# Patient Record
Sex: FEMALE | Race: WHITE | Employment: OTHER | URBAN - NONMETROPOLITAN AREA
[De-identification: names, ages, dates, MRNs, and addresses within clinical notes are randomized per-mention and may not be internally consistent; named-entity substitution may affect disease eponyms.]

---

## 2019-01-17 ENCOUNTER — HOSPITAL ENCOUNTER (EMERGENCY)
Age: 84
Discharge: HOME | End: 2019-01-17
Payer: MEDICARE

## 2019-01-17 VITALS
RESPIRATION RATE: 16 BRPM | SYSTOLIC BLOOD PRESSURE: 197 MMHG | OXYGEN SATURATION: 98 % | DIASTOLIC BLOOD PRESSURE: 84 MMHG | HEART RATE: 65 BPM | WEIGHT: 129.4 LBS | BODY MASS INDEX: 27.16 KG/M2 | HEIGHT: 58 IN | TEMPERATURE: 97.2 F

## 2019-01-17 DIAGNOSIS — I10 ELEVATED BLOOD PRESSURE READING WITH DIAGNOSIS OF HYPERTENSION: ICD-10-CM

## 2019-01-17 DIAGNOSIS — H61.23 BILATERAL IMPACTED CERUMEN: Primary | ICD-10-CM

## 2019-01-17 PROCEDURE — 99213 OFFICE O/P EST LOW 20 MIN: CPT | Performed by: NURSE PRACTITIONER

## 2019-01-17 PROCEDURE — 99213 OFFICE O/P EST LOW 20 MIN: CPT

## 2019-01-17 PROCEDURE — 69209 REMOVE IMPACTED EAR WAX UNI: CPT

## 2019-01-17 PROCEDURE — 2709999900 HC NON-CHARGEABLE SUPPLY

## 2019-01-17 ASSESSMENT — ENCOUNTER SYMPTOMS
ABDOMINAL PAIN: 0
NAUSEA: 0
SHORTNESS OF BREATH: 0
VOMITING: 0
CHEST TIGHTNESS: 0
DIARRHEA: 0

## 2024-04-14 ENCOUNTER — APPOINTMENT (OUTPATIENT)
Dept: GENERAL RADIOLOGY | Age: 89
DRG: 253 | End: 2024-04-14
Payer: MEDICARE

## 2024-04-14 ENCOUNTER — HOSPITAL ENCOUNTER (INPATIENT)
Age: 89
LOS: 6 days | Discharge: HOME | DRG: 253 | End: 2024-04-20
Attending: FAMILY MEDICINE
Payer: MEDICARE

## 2024-04-14 DIAGNOSIS — Z01.818 PREOPERATIVE CLEARANCE: ICD-10-CM

## 2024-04-14 DIAGNOSIS — N18.31 STAGE 3A CHRONIC KIDNEY DISEASE (HCC): ICD-10-CM

## 2024-04-14 DIAGNOSIS — Z98.890 S/P ANGIOGRAM OF EXTREMITY: ICD-10-CM

## 2024-04-14 DIAGNOSIS — I96 GANGRENE OF TOE OF LEFT FOOT (HCC): Primary | ICD-10-CM

## 2024-04-14 DIAGNOSIS — M86.18 OTHER ACUTE OSTEOMYELITIS, OTHER SITE (HCC): ICD-10-CM

## 2024-04-14 DIAGNOSIS — M86.9 OSTEOMYELITIS OF SECOND TOE OF LEFT FOOT (HCC): ICD-10-CM

## 2024-04-14 PROBLEM — E87.5 HYPERKALEMIA: Status: ACTIVE | Noted: 2024-04-14

## 2024-04-14 PROBLEM — E87.1 HYPONATREMIA: Status: ACTIVE | Noted: 2024-04-14

## 2024-04-14 LAB
ALBUMIN SERPL BCG-MCNC: 3.4 G/DL (ref 3.5–5.1)
ALP SERPL-CCNC: 145 U/L (ref 38–126)
ALT SERPL W/O P-5'-P-CCNC: 12 U/L (ref 11–66)
ANION GAP SERPL CALC-SCNC: 12 MEQ/L (ref 8–16)
AST SERPL-CCNC: 16 U/L (ref 5–40)
BASOPHILS ABSOLUTE: 0.1 THOU/MM3 (ref 0–0.1)
BASOPHILS NFR BLD AUTO: 1 %
BILIRUB CONJ SERPL-MCNC: < 0.2 MG/DL (ref 0–0.3)
BILIRUB SERPL-MCNC: 0.4 MG/DL (ref 0.3–1.2)
BUN SERPL-MCNC: 22 MG/DL (ref 7–22)
CALCIUM SERPL-MCNC: 9 MG/DL (ref 8.5–10.5)
CHLORIDE SERPL-SCNC: 97 MEQ/L (ref 98–111)
CO2 SERPL-SCNC: 22 MEQ/L (ref 23–33)
CREAT SERPL-MCNC: 1.3 MG/DL (ref 0.4–1.2)
CRP SERPL-MCNC: 6.97 MG/DL (ref 0–1)
DEPRECATED RDW RBC AUTO: 47.7 FL (ref 35–45)
EOSINOPHIL NFR BLD AUTO: 2.6 %
EOSINOPHILS ABSOLUTE: 0.2 THOU/MM3 (ref 0–0.4)
ERYTHROCYTE [DISTWIDTH] IN BLOOD BY AUTOMATED COUNT: 13.2 % (ref 11.5–14.5)
ERYTHROCYTE [SEDIMENTATION RATE] IN BLOOD BY WESTERGREN METHOD: 38 MM/HR (ref 0–20)
GFR SERPL CREATININE-BSD FRML MDRD: 40 ML/MIN/1.73M2
GLUCOSE BLD STRIP.AUTO-MCNC: 174 MG/DL (ref 70–108)
GLUCOSE BLD STRIP.AUTO-MCNC: 215 MG/DL (ref 70–108)
GLUCOSE BLD STRIP.AUTO-MCNC: 244 MG/DL (ref 70–108)
GLUCOSE SERPL-MCNC: 273 MG/DL (ref 70–108)
HCT VFR BLD AUTO: 43.9 % (ref 37–47)
HGB BLD-MCNC: 14 GM/DL (ref 12–16)
IMM GRANULOCYTES # BLD AUTO: 0.04 THOU/MM3 (ref 0–0.07)
IMM GRANULOCYTES NFR BLD AUTO: 0.6 %
LACTIC ACID, SEPSIS: 2.7 MMOL/L (ref 0.5–1.9)
LYMPHOCYTES ABSOLUTE: 1.6 THOU/MM3 (ref 1–4.8)
LYMPHOCYTES NFR BLD AUTO: 21.5 %
MCH RBC QN AUTO: 31.7 PG (ref 26–33)
MCHC RBC AUTO-ENTMCNC: 31.9 GM/DL (ref 32.2–35.5)
MCV RBC AUTO: 99.5 FL (ref 81–99)
MONOCYTES ABSOLUTE: 0.6 THOU/MM3 (ref 0.4–1.3)
MONOCYTES NFR BLD AUTO: 8.8 %
NEUTROPHILS NFR BLD AUTO: 65.5 %
NRBC BLD AUTO-RTO: 0 /100 WBC
OSMOLALITY SERPL CALC.SUM OF ELEC: 275.7 MOSMOL/KG (ref 275–300)
PLATELET # BLD AUTO: 236 THOU/MM3 (ref 130–400)
PMV BLD AUTO: 9.3 FL (ref 9.4–12.4)
POTASSIUM SERPL-SCNC: 5.3 MEQ/L (ref 3.5–5.2)
PROT SERPL-MCNC: 7.1 G/DL (ref 6.1–8)
RBC # BLD AUTO: 4.41 MILL/MM3 (ref 4.2–5.4)
REASON FOR REJECTION: NORMAL
REJECTED TEST: NORMAL
SEGMENTED NEUTROPHILS ABSOLUTE COUNT: 4.8 THOU/MM3 (ref 1.8–7.7)
SODIUM SERPL-SCNC: 131 MEQ/L (ref 135–145)
WBC # BLD AUTO: 7.3 THOU/MM3 (ref 4.8–10.8)

## 2024-04-14 PROCEDURE — 6360000002 HC RX W HCPCS: Performed by: FAMILY MEDICINE

## 2024-04-14 PROCEDURE — 87077 CULTURE AEROBIC IDENTIFY: CPT

## 2024-04-14 PROCEDURE — 36415 COLL VENOUS BLD VENIPUNCTURE: CPT

## 2024-04-14 PROCEDURE — 2580000003 HC RX 258: Performed by: PHYSICIAN ASSISTANT

## 2024-04-14 PROCEDURE — 73660 X-RAY EXAM OF TOE(S): CPT

## 2024-04-14 PROCEDURE — 82948 REAGENT STRIP/BLOOD GLUCOSE: CPT

## 2024-04-14 PROCEDURE — 82248 BILIRUBIN DIRECT: CPT

## 2024-04-14 PROCEDURE — 87205 SMEAR GRAM STAIN: CPT

## 2024-04-14 PROCEDURE — 86140 C-REACTIVE PROTEIN: CPT

## 2024-04-14 PROCEDURE — 83605 ASSAY OF LACTIC ACID: CPT

## 2024-04-14 PROCEDURE — 85651 RBC SED RATE NONAUTOMATED: CPT

## 2024-04-14 PROCEDURE — 99222 1ST HOSP IP/OBS MODERATE 55: CPT | Performed by: INTERNAL MEDICINE

## 2024-04-14 PROCEDURE — 6360000002 HC RX W HCPCS: Performed by: PHYSICIAN ASSISTANT

## 2024-04-14 PROCEDURE — 87186 SC STD MICRODIL/AGAR DIL: CPT

## 2024-04-14 PROCEDURE — 87040 BLOOD CULTURE FOR BACTERIA: CPT

## 2024-04-14 PROCEDURE — 2580000003 HC RX 258: Performed by: INTERNAL MEDICINE

## 2024-04-14 PROCEDURE — 2580000003 HC RX 258: Performed by: FAMILY MEDICINE

## 2024-04-14 PROCEDURE — 80053 COMPREHEN METABOLIC PANEL: CPT

## 2024-04-14 PROCEDURE — 87075 CULTR BACTERIA EXCEPT BLOOD: CPT

## 2024-04-14 PROCEDURE — 6370000000 HC RX 637 (ALT 250 FOR IP): Performed by: PHYSICIAN ASSISTANT

## 2024-04-14 PROCEDURE — 1200000003 HC TELEMETRY R&B

## 2024-04-14 PROCEDURE — 96365 THER/PROPH/DIAG IV INF INIT: CPT

## 2024-04-14 PROCEDURE — 87147 CULTURE TYPE IMMUNOLOGIC: CPT

## 2024-04-14 PROCEDURE — 85025 COMPLETE CBC W/AUTO DIFF WBC: CPT

## 2024-04-14 PROCEDURE — 87070 CULTURE OTHR SPECIMN AEROBIC: CPT

## 2024-04-14 PROCEDURE — 99285 EMERGENCY DEPT VISIT HI MDM: CPT

## 2024-04-14 PROCEDURE — 99223 1ST HOSP IP/OBS HIGH 75: CPT | Performed by: PHYSICIAN ASSISTANT

## 2024-04-14 RX ORDER — DEXTROSE MONOHYDRATE 100 MG/ML
INJECTION, SOLUTION INTRAVENOUS CONTINUOUS PRN
Status: DISCONTINUED | OUTPATIENT
Start: 2024-04-14 | End: 2024-04-20 | Stop reason: HOSPADM

## 2024-04-14 RX ORDER — INSULIN LISPRO 100 [IU]/ML
0-4 INJECTION, SOLUTION INTRAVENOUS; SUBCUTANEOUS NIGHTLY
Status: DISCONTINUED | OUTPATIENT
Start: 2024-04-14 | End: 2024-04-16

## 2024-04-14 RX ORDER — CARVEDILOL 3.12 MG/1
3.12 TABLET ORAL 2 TIMES DAILY WITH MEALS
COMMUNITY

## 2024-04-14 RX ORDER — ACETAMINOPHEN 325 MG/1
650 TABLET ORAL EVERY 6 HOURS PRN
Status: DISCONTINUED | OUTPATIENT
Start: 2024-04-14 | End: 2024-04-15

## 2024-04-14 RX ORDER — SODIUM CHLORIDE 9 MG/ML
INJECTION, SOLUTION INTRAVENOUS PRN
Status: DISCONTINUED | OUTPATIENT
Start: 2024-04-14 | End: 2024-04-20 | Stop reason: HOSPADM

## 2024-04-14 RX ORDER — SODIUM CHLORIDE 0.9 % (FLUSH) 0.9 %
5-40 SYRINGE (ML) INJECTION EVERY 12 HOURS SCHEDULED
Status: DISCONTINUED | OUTPATIENT
Start: 2024-04-14 | End: 2024-04-20 | Stop reason: HOSPADM

## 2024-04-14 RX ORDER — HEPARIN SODIUM 5000 [USP'U]/ML
5000 INJECTION, SOLUTION INTRAVENOUS; SUBCUTANEOUS EVERY 8 HOURS SCHEDULED
Status: DISCONTINUED | OUTPATIENT
Start: 2024-04-14 | End: 2024-04-20 | Stop reason: HOSPADM

## 2024-04-14 RX ORDER — BUMETANIDE 1 MG/1
1 TABLET ORAL
Status: DISCONTINUED | OUTPATIENT
Start: 2024-04-15 | End: 2024-04-20 | Stop reason: HOSPADM

## 2024-04-14 RX ORDER — ISOSORBIDE MONONITRATE 30 MG/1
30 TABLET, EXTENDED RELEASE ORAL DAILY
COMMUNITY

## 2024-04-14 RX ORDER — ISOSORBIDE MONONITRATE 30 MG/1
30 TABLET, EXTENDED RELEASE ORAL DAILY
Status: DISCONTINUED | OUTPATIENT
Start: 2024-04-15 | End: 2024-04-20 | Stop reason: HOSPADM

## 2024-04-14 RX ORDER — ONDANSETRON 4 MG/1
4 TABLET, ORALLY DISINTEGRATING ORAL EVERY 8 HOURS PRN
Status: DISCONTINUED | OUTPATIENT
Start: 2024-04-14 | End: 2024-04-20 | Stop reason: HOSPADM

## 2024-04-14 RX ORDER — LANOLIN ALCOHOL/MO/W.PET/CERES
325 CREAM (GRAM) TOPICAL
COMMUNITY

## 2024-04-14 RX ORDER — SACUBITRIL AND VALSARTAN 24; 26 MG/1; MG/1
1 TABLET, FILM COATED ORAL 2 TIMES DAILY
COMMUNITY

## 2024-04-14 RX ORDER — METRONIDAZOLE 500 MG/100ML
500 INJECTION, SOLUTION INTRAVENOUS EVERY 8 HOURS
Status: DISCONTINUED | OUTPATIENT
Start: 2024-04-14 | End: 2024-04-17

## 2024-04-14 RX ORDER — GLIMEPIRIDE 4 MG/1
4 TABLET ORAL 2 TIMES DAILY
COMMUNITY

## 2024-04-14 RX ORDER — FLUOXETINE 10 MG/1
10 TABLET, FILM COATED ORAL DAILY
COMMUNITY

## 2024-04-14 RX ORDER — M-VIT,TX,IRON,MINS/CALC/FOLIC 27MG-0.4MG
1 TABLET ORAL DAILY
COMMUNITY

## 2024-04-14 RX ORDER — INSULIN LISPRO 100 [IU]/ML
0-4 INJECTION, SOLUTION INTRAVENOUS; SUBCUTANEOUS
Status: DISCONTINUED | OUTPATIENT
Start: 2024-04-14 | End: 2024-04-16

## 2024-04-14 RX ORDER — SODIUM CHLORIDE 0.9 % (FLUSH) 0.9 %
5-40 SYRINGE (ML) INJECTION PRN
Status: DISCONTINUED | OUTPATIENT
Start: 2024-04-14 | End: 2024-04-20 | Stop reason: HOSPADM

## 2024-04-14 RX ORDER — ACETAMINOPHEN 650 MG/1
650 SUPPOSITORY RECTAL EVERY 6 HOURS PRN
Status: DISCONTINUED | OUTPATIENT
Start: 2024-04-14 | End: 2024-04-18

## 2024-04-14 RX ORDER — BUMETANIDE 1 MG/1
1 TABLET ORAL
COMMUNITY

## 2024-04-14 RX ORDER — CARVEDILOL 3.12 MG/1
3.12 TABLET ORAL 2 TIMES DAILY WITH MEALS
Status: DISCONTINUED | OUTPATIENT
Start: 2024-04-14 | End: 2024-04-20 | Stop reason: HOSPADM

## 2024-04-14 RX ORDER — ATORVASTATIN CALCIUM 20 MG/1
20 TABLET, FILM COATED ORAL NIGHTLY
Status: DISCONTINUED | OUTPATIENT
Start: 2024-04-14 | End: 2024-04-16

## 2024-04-14 RX ORDER — FERROUS SULFATE 325(65) MG
325 TABLET ORAL 2 TIMES DAILY WITH MEALS
Status: DISCONTINUED | OUTPATIENT
Start: 2024-04-14 | End: 2024-04-20 | Stop reason: HOSPADM

## 2024-04-14 RX ORDER — GLUCAGON 1 MG/ML
1 KIT INJECTION PRN
Status: DISCONTINUED | OUTPATIENT
Start: 2024-04-14 | End: 2024-04-20 | Stop reason: HOSPADM

## 2024-04-14 RX ORDER — SODIUM CHLORIDE 9 MG/ML
INJECTION, SOLUTION INTRAVENOUS CONTINUOUS
Status: DISCONTINUED | OUTPATIENT
Start: 2024-04-14 | End: 2024-04-16

## 2024-04-14 RX ORDER — MIRTAZAPINE 15 MG/1
15 TABLET, FILM COATED ORAL NIGHTLY
Status: DISCONTINUED | OUTPATIENT
Start: 2024-04-14 | End: 2024-04-20 | Stop reason: HOSPADM

## 2024-04-14 RX ORDER — ONDANSETRON 2 MG/ML
4 INJECTION INTRAMUSCULAR; INTRAVENOUS EVERY 6 HOURS PRN
Status: DISCONTINUED | OUTPATIENT
Start: 2024-04-14 | End: 2024-04-20 | Stop reason: HOSPADM

## 2024-04-14 RX ORDER — FLUOXETINE 10 MG/1
10 CAPSULE ORAL DAILY
Status: DISCONTINUED | OUTPATIENT
Start: 2024-04-15 | End: 2024-04-20 | Stop reason: HOSPADM

## 2024-04-14 RX ORDER — POLYETHYLENE GLYCOL 3350 17 G/17G
17 POWDER, FOR SOLUTION ORAL DAILY PRN
Status: DISCONTINUED | OUTPATIENT
Start: 2024-04-14 | End: 2024-04-20 | Stop reason: HOSPADM

## 2024-04-14 RX ORDER — MIRTAZAPINE 15 MG/1
15 TABLET, FILM COATED ORAL ONCE
COMMUNITY

## 2024-04-14 RX ORDER — ASPIRIN 81 MG/1
81 TABLET, CHEWABLE ORAL DAILY
Status: DISCONTINUED | OUTPATIENT
Start: 2024-04-15 | End: 2024-04-20 | Stop reason: HOSPADM

## 2024-04-14 RX ORDER — 0.9 % SODIUM CHLORIDE 0.9 %
500 INTRAVENOUS SOLUTION INTRAVENOUS ONCE
Status: COMPLETED | OUTPATIENT
Start: 2024-04-14 | End: 2024-04-14

## 2024-04-14 RX ORDER — POTASSIUM CHLORIDE 750 MG/1
10 TABLET, EXTENDED RELEASE ORAL
COMMUNITY

## 2024-04-14 RX ADMIN — VANCOMYCIN HYDROCHLORIDE 1250 MG: 5 INJECTION, POWDER, LYOPHILIZED, FOR SOLUTION INTRAVENOUS at 19:49

## 2024-04-14 RX ADMIN — ATORVASTATIN CALCIUM 20 MG: 20 TABLET, FILM COATED ORAL at 21:27

## 2024-04-14 RX ADMIN — METRONIDAZOLE 500 MG: 500 INJECTION, SOLUTION INTRAVENOUS at 18:33

## 2024-04-14 RX ADMIN — CEFEPIME 2000 MG: 2 INJECTION, POWDER, FOR SOLUTION INTRAVENOUS at 17:59

## 2024-04-14 RX ADMIN — INSULIN LISPRO 1 UNITS: 100 INJECTION, SOLUTION INTRAVENOUS; SUBCUTANEOUS at 18:38

## 2024-04-14 RX ADMIN — CARVEDILOL 3.12 MG: 3.12 TABLET, FILM COATED ORAL at 18:36

## 2024-04-14 RX ADMIN — SODIUM CHLORIDE 500 ML: 9 INJECTION, SOLUTION INTRAVENOUS at 18:34

## 2024-04-14 RX ADMIN — FERROUS SULFATE TAB 325 MG (65 MG ELEMENTAL FE) 325 MG: 325 (65 FE) TAB at 18:34

## 2024-04-14 RX ADMIN — HEPARIN SODIUM 5000 UNITS: 5000 INJECTION INTRAVENOUS; SUBCUTANEOUS at 18:39

## 2024-04-14 RX ADMIN — SODIUM CHLORIDE, PRESERVATIVE FREE 10 ML: 5 INJECTION INTRAVENOUS at 21:27

## 2024-04-14 RX ADMIN — PIPERACILLIN AND TAZOBACTAM 4500 MG: 4; .5 INJECTION, POWDER, FOR SOLUTION INTRAVENOUS at 15:16

## 2024-04-14 RX ADMIN — MIRTAZAPINE 15 MG: 15 TABLET, FILM COATED ORAL at 21:27

## 2024-04-14 RX ADMIN — SODIUM CHLORIDE: 9 INJECTION, SOLUTION INTRAVENOUS at 21:29

## 2024-04-14 ASSESSMENT — PAIN DESCRIPTION - LOCATION: LOCATION: TOE (COMMENT WHICH ONE)

## 2024-04-14 ASSESSMENT — PAIN SCALES - GENERAL
PAINLEVEL_OUTOF10: 5
PAINLEVEL_OUTOF10: 0

## 2024-04-14 ASSESSMENT — PAIN - FUNCTIONAL ASSESSMENT: PAIN_FUNCTIONAL_ASSESSMENT: 0-10

## 2024-04-14 ASSESSMENT — PAIN DESCRIPTION - FREQUENCY: FREQUENCY: CONTINUOUS

## 2024-04-14 ASSESSMENT — PAIN DESCRIPTION - PAIN TYPE: TYPE: ACUTE PAIN

## 2024-04-14 NOTE — ED NOTES
ED to inpatient nurses report      Chief Complaint:  No chief complaint on file.    Present to ED from: home    MOA:     LOC: alert and orientated to name, place, date  Mobility: Requires assistance * 1  Oxygen Baseline: room air    Current needs required: none     Code Status:   Full Code    What abnormal results were found and what did you give/do to treat them? Lactic 2.7  Any procedures or intervention occur? zosyn    Mental Status:       Psych Assessment:        Vitals:  Patient Vitals for the past 24 hrs:   BP Temp Temp src Pulse Resp SpO2 Height Weight   04/14/24 1553 (!) 175/66 -- -- 80 -- 97 % -- --   04/14/24 1423 (!) 171/74 -- -- 80 -- 96 % -- --   04/14/24 1252 (!) 179/76 97.9 °F (36.6 °C) Oral 84 18 99 % 1.473 m (4' 10\") 58.1 kg (128 lb)        LDAs:   Peripheral IV 04/14/24 Distal;Posterior;Right Forearm (Active)   Site Assessment Clean, dry & intact 04/14/24 1606   Line Status Flushed 04/14/24 1606   Line Care Connections checked and tightened 04/14/24 1606   Phlebitis Assessment No symptoms 04/14/24 1606   Infiltration Assessment 0 04/14/24 1606   Dressing Status Clean, dry & intact 04/14/24 1606   Dressing Type Transparent 04/14/24 1606       Ambulatory Status:  No data recorded    Diagnosis:  DISPOSITION Admitted 04/14/2024 04:09:03 PM   Final diagnoses:   Gangrene of toe of left foot (HCC)   Osteomyelitis of second toe of left foot (HCC)        Consults:  IP CONSULT TO PHARMACY     Pain Score:  Pain Assessment  Pain Assessment: 0-10  Pain Level: 5  Pain Location: Toe (Comment which one)  Pain Type: Acute pain  Pain Frequency: Continuous    C-SSRS:        Sepsis Screening:  Sepsis Risk Score: 1.33    [place] Fall Risk:       Swallow Screening        Preferred Language:   English      ALLERGIES     Patient has no known allergies.    SURGICAL HISTORY       Past Surgical History:   Procedure Laterality Date    CARDIAC CATHETERIZATION      with stenting    CHOLECYSTECTOMY      CORONARY ANGIOPLASTY WITH

## 2024-04-14 NOTE — H&P
Hospitalist History & Physical    Patient:  Shelli Herndon    Unit/Bed:31/031A  YOB: 1935  MRN: [id number]   Acct: [id number]   PCP: Gurmeet Reilly MD  Code Status: Full Code    Date of Service: The patient was seen and examined on 04/14/24 and admitted to Inpatient with an expected length of stay of greater than two midnights due to medical therapy.     Chief Complaint: Toe wound    Assessment/Plan:    Osteomyelitis of the left second digit of the left lower extremity  Noted on x-ray.  Follows with podiatry outpatient.  Noted to have MRSA and Enterobacter cloacae on recent culture on 3/28/2024.  Podiatry following.  Planning for amputation of digit.  Consult cardiology for consideration of peripheral artery intervention per podiatry recommendation.  Due to presence of Enterobacter, discussed antimicrobial coverage with pharmacy.  Recommended coverage with cefepime for Enterobacter.  Start vancomycin due to MRSA.  Add anaerobic coverage with Flagyl.  Peripheral arterial disease  Recent arterial Doppler with chronic occlusion of the right mid SFA, severely decreased bilateral ABIs, moderate stenosis of the left distal SFA  Continue aspirin.  Cardiology consult for consideration of further evaluation/intervention prior to amputation.  Non-insulin-dependent type 2 diabetes mellitus  Hold oral meds. Carb controlled diet. Accuchecks. Low dose sliding scale.  Hyponatremia  131.  Most recently 134 on 7/24/2023.  Small bolus of normal saline given in the emergency department.  Repeat BMP tomorrow morning.  Hyperkalemia  5.3.  Likely secondary to CKD and ARB use as well as potassium supplementation.  Hold ARB.  Hold potassium supplementation.  Recheck BMP tomorrow morning.  Lactic acidosis  2.7.  Small bolus of IV fluids given.  Recheck lactic in the morning.  HFrEF not in acute exacerbation  Echocardiogram not available.  Order placed to obtain records from [facility].  Diuretic regimen includes Bumex 1 mg

## 2024-04-14 NOTE — ED PROVIDER NOTES
EMERGENCY DEPARTMENT ENCOUNTER     CHIEF COMPLAINT   No chief complaint on file.       HPI   Shelli Herndno is a 88 y.o. female who presents with a musculoskeletal complaint, specifically macerated and reddened left 2nd toe. The onset was last day or two. The reason why the patient has this issue was she walked on her hammer toes often, and that same toe was recently diagnosed with MRSA skin infection. Per pt and her daughter Anamika (who served as an independent historian), pt had an outpatient arterial doppler done, that showed poor circulation, and she was on the path to needing Dr. CINTIA Horan to help with stenting. The patient complains of left 2nd toe infection, but due to her neuropathy is not really in that much pain. The quality is infectious. The situation was: as stateed The patient has no associated fevers, chills or purulent drainage.     Pt does not take any anti-coagulants.    PAST MEDICAL & SURGICAL HISTORY   Past Medical History:   Diagnosis Date    CAD (coronary artery disease)     heart attack    Diabetes mellitus (HCC)     Heart attack (HCC)     Hyperlipidemia     Hypertension       Past Surgical History:   Procedure Laterality Date    CARDIAC CATHETERIZATION      with stenting    CHOLECYSTECTOMY      CORONARY ANGIOPLASTY WITH STENT PLACEMENT      HYSTERECTOMY          CURRENT MEDICATIONS   Current Outpatient Rx   Medication Sig Dispense Refill    lisinopril (PRINIVIL;ZESTRIL) 20 MG tablet Take 1 tablet by mouth 2 times daily. 30 tablet 2    glipiZIDE (GLUCOTROL) 5 MG tablet Take 5 mg by mouth 2 times daily (before meals).        sitaGLIPtan (JANUVIA) 50 MG tablet Take 100 mg by mouth daily.        pioglitazone (ACTOS) 15 MG tablet Take 15 mg by mouth daily.        atorvastatin (LIPITOR) 10 MG tablet Take 10 mg by mouth daily.        propranolol (INDERAL LA) 120 MG CR capsule Take 120 mg by mouth daily.        aspirin 325 MG EC tablet Take 325 mg by mouth daily.            ALLERGIES   No Known  Chloride 97 (*)     CO2 22 (*)     Glucose 273 (*)     Creatinine 1.3 (*)     All other components within normal limits   CBC WITH AUTO DIFFERENTIAL - Abnormal; Notable for the following components:    MCV 99.5 (*)     MCHC 31.9 (*)     RDW-SD 47.7 (*)     MPV 9.3 (*)     All other components within normal limits   C-REACTIVE PROTEIN - Abnormal; Notable for the following components:    CRP 6.97 (*)     All other components within normal limits   SEDIMENTATION RATE - Abnormal; Notable for the following components:    Sed Rate 38 (*)     All other components within normal limits   GLOMERULAR FILTRATION RATE, ESTIMATED - Abnormal; Notable for the following components:    Est, Glom Filt Rate 40 (*)     All other components within normal limits   CULTURE, BLOOD 1   CULTURE, BLOOD 2   CULTURE, ANAEROBIC AND AEROBIC   SPECIMEN REJECTION   ANION GAP   OSMOLALITY        Initial Impression and Plan:  Pertinent Labs & Imaging studies reviewed and interpreted. (See chart for details)   Vitals:    04/14/24 1423   BP: (!) 171/74   Pulse: 80   Resp:    Temp:    SpO2: 96%   This is an acute problem with unknown prognosis. Given the acute worsening of the soft tissue surrounding the left 2nd hammer toe, pt likely will benefit from podiatry consultation and admission, with subsequent vascular consult (Dr. CINTIA Horan) re her left leg PVD.    Differential Diagnosis: Abscess of left 2nd toe, osteomyelitis, sepsis, Occult fracture, extremity sprain and strain    FINAL IMPRESSION   1. Gangrene of toe of left foot (HCC)    2. Osteomyelitis of second toe of left foot (HCC)       I ordered the above laboratory panel and plain films. All signs point to a serious infection and likely need for toe amputation by podiatry. Moreover, pt also may be developing osteomyelitis of that same toe. She was hemodynamically stable, and due to concern for sepsis, will add blood cultures as well as start pt on Zosyn.    ED COURSE & MEDICAL DECISION MAKING   Alexander

## 2024-04-14 NOTE — PROGRESS NOTES
Pharmacy Note - Extended Infusion Beta-Lactam Dose Adjustment    Piperacillin/Tazobactam 3375 mg x 1 intermittent infusion ordered for the treatment of Skin and soft tissue infection. Per [facility] Extended Infusion Beta-Lactam Policy, this will be changed to 4500 mg loading dose x 1     Estimated Creatinine Clearance: CrCl cannot be calculated (Patient's most recent lab result is older than the maximum 30 days allowed.).    Dialysis Status, PEBBLES, CKD: N/A    BMI: Body mass index is 26.75 kg/m².    Rationale for Adjustment: Dose adjusted per [facility] Extended Infusion Policy based on renal function and indication. The above medication is renally eliminated and demonstrates time-dependent effects on bacterial eradication. Extended-infusion dosing strategy aims to enhance microbiologic and clinical efficacy.     Pharmacy will monitor renal function daily and adjust dose as necessary.      Please call with any questions.    Thank you,  Kimi Cochran, PharmD  4/14/2024  1:30 PM

## 2024-04-14 NOTE — PROGRESS NOTES
Pharmacy Note - Extended Infusion Beta-Lactam Dose Adjustment    Cefepime 1000 mg q24h intermittent infusion for treatment of SSTI and bone and joint infection. Per [facility] Extended Infusion Beta-Lactam Policy, cefepime will be changed to 2000 mg loading dose followed by 1000 mg q12h extended infusion     Estimated Creatinine Clearance: Estimated Creatinine Clearance: 25 mL/min (A) (based on SCr of 1.3 mg/dL (H)).    Dialysis Status, PEBBLES, CKD: PEBBLES on CKD    BMI: Body mass index is 26.75 kg/m².    Rationale for Adjustment: Dose adjusted per [facility] Extended Infusion Policy based on renal function and indication. The above medication is renally eliminated and demonstrates time-dependent effects on bacterial eradication. Extended-infusion dosing strategy aims to enhance microbiologic and clinical efficacy.     Pharmacy will monitor renal function daily and adjust dose as necessary.      Please call with any questions.    Thank you,  Philomena Bravo, PharmD  PGY1 Resident

## 2024-04-14 NOTE — PROGRESS NOTES
[facility]   Pharmacy Pharmacokinetic Monitoring Service - Vancomycin     Shelli Herndon is a 88 y.o. female starting on vancomycin therapy for SSTI, bone & joint infection. Pharmacy consulted by Raheel Gil for monitoring and adjustment.    Target Concentration:     Additional Antimicrobials: Cefepime, Metronidazole     Pertinent Laboratory Values:   Wt Readings from Last 1 Encounters:   04/14/24 58.1 kg (128 lb)     Temp Readings from Last 1 Encounters:   04/14/24 97.9 °F (36.6 °C) (Oral)     Estimated Creatinine Clearance: 25 mL/min (A) (based on SCr of 1.3 mg/dL (H)).  Recent Labs     04/14/24  1335   CREATININE 1.3*   BUN 22   WBC 7.3     Pertinent Cultures:  Date Source Results   3/28/24 Left 2nd toe ulcer MRSA, enterobacter   MRSA Nasal Swab: N/A. Non-respiratory infection.    Plan:  Concentration-guided dosing due to renal impairment/insufficiency  Start vancomycin 1250 mg IV X 1  Renal labs as indicated- has BMP ordered   Vancomycin concentration ordered for 04/15/24 @ 1800   Pharmacy will continue to monitor patient and adjust therapy as indicated    Thank you for the consult,  Mary Ramirez PharmD 4/14/2024 5:14 PM

## 2024-04-14 NOTE — ED NOTES
Pt to ED with c/c R second toe infection. Pt states following up with Dr benton and states appointment in two weeks. Pt states taking bactrim x2 weeks. Pt states increased swelling and redness at site, inability to bear weight on foot.

## 2024-04-14 NOTE — CONSULTS
Result: See Report      CLINICAL:  CLAUDICATION    EXAMINATION:  ULTRASOUND ARTERIAL DOPPLER BILATERAL LEGS AND MADYSON'S    TECHNIQUE:  Gray scale and Doppler ultrasound imaging of the lower extremity arteries.  Bilateral MADYSON performed. Bilateral TBI attempted.    COMPARISON:  None.      ___________________________________    FINDINGS:    Right:  CFA: 166 cm/sec. Mild atherosclerosis without hemodynamically stenosis.  Proximal SFA: 107 cm/sec. Moderate atherosclerosis with moderate (50-74%) stenosis.  Mid SFA: 0 cm/sec. No color flow.  Distal SFA: 83 cm/sec. atherosclerosis with monophasic flow.  Popliteal artery: 54 cm/sec. atherosclerosis with monophasic flow.  Posterior tibial artery: 18 cm/sec. patent  Anterior tibial artery: 51 cm/sec. patent    Left:  CFA: 84 cm/sec. Mild atherosclerosis without hemodynamically stenosis.  Proximal SFA: 61 cm/sec. Mild atherosclerosis without hemodynamically stenosis.  Mid SFA: 58 cm/sec. Mild atherosclerosis without hemodynamically stenosis.  distal SFA: 142 cm/sec. Moderate atherosclerosis with moderate (50-74%) stenosis.  Popliteal artery: 25 cm/sec. Mild atherosclerosis without hemodynamically stenosis.  Posterior tibial artery: 16 cm/sec. patent  Anterior tibial artery: 8 cm/sec. patent    MADYSON:  Right DP 0.36  Right PT 0.27    Left DP 0.39  Left PT 0.49    TBI:    Digit PVR and PPG attempted but very limited. Diffuse diminished waveforms throughout the bilateral toes. TBI not obtainable.  ___________________________________    IMPRESSION:  1.  Chronic occlusion of right mid SFA.  2.  Severe decreased bilateral MADYSON.  3.  Moderate stenosis of left distal SFA.  4.  Monophasic waveforms of the bilateral digits but TBI not obtainable.        cc: Rachna Sierra DPM; Gurmeet Reilly MD      Dictated by:  KALEB Elias MD on 04/04/24 1455  Transcribed by:  KALEB Elias on 04/04/24 1501    Report Signed by:  KALEB Elias MD on 04/04/24 1501       LABS:   Recent Labs      pulse 80, temperature 97.9 °F, respirations 18, SpO2 99%  - Culture: Attention left second digit results pending.  - Patient given IV Zosyn antibiotics; per emergency department  - Applied dressing consisting of: Betadine, gauze, DSD  - Post-op surgical shoe ordered  - X-rays ordered and reviewed; see above  - Recommend vascular consult upon admission to establish blood flow.  - Weight-bear as tolerated to left lower extremity postop surgical shoe  - Discussed with patient that at this point given the open nature of the wound and positive probe to bone combined with infectious processes taking place amputation of the left second digit is highly recommended at this time.  - Discussed with patient the vascular consult will need to be placed and she will be need to be cleared from a vascular standpoint for any surgical procedures.  - Discussed with patient that in conjunction with infectious process being admitted would accelerate the process of vascular workup ultimately increasing the timeframe in which amputation of the left second digit can take place providing patient relief from the ulceration and infectious process.  - Patient related understanding and was clear on the plan of medical care per podiatry.  Case was discussed with the emergency department physician Dr. Posada that admission and further vascular and surgical intervention would be necessary for this patient at this time.  - All of patient's questions and concerns addressed at today's encounter.  - Podiatry will continue to follow patient    DISPO: Culture results, vascular consult and workup.  Will require left second digit amputation.    Thank you for the consultation allowing podiatry to assist in the medical welfare of this patient. Podiatry will continue to follow this patient throughout the duration of hospitalization.     Bennett Boudreaux DPM -PGY1  Foot and Ankle Surgical Resident  4/14/2024 2:34 PM  \

## 2024-04-15 ENCOUNTER — APPOINTMENT (OUTPATIENT)
Dept: ULTRASOUND IMAGING | Age: 89
DRG: 253 | End: 2024-04-15
Payer: MEDICARE

## 2024-04-15 ENCOUNTER — APPOINTMENT (OUTPATIENT)
Dept: INTERVENTIONAL RADIOLOGY/VASCULAR | Age: 89
DRG: 253 | End: 2024-04-15
Payer: MEDICARE

## 2024-04-15 PROBLEM — I96 GANGRENE OF TOE OF LEFT FOOT (HCC): Status: ACTIVE | Noted: 2024-04-15

## 2024-04-15 LAB
ANION GAP SERPL CALC-SCNC: 15 MEQ/L (ref 8–16)
BASOPHILS ABSOLUTE: 0 THOU/MM3 (ref 0–0.1)
BASOPHILS NFR BLD AUTO: 0.9 %
BUN SERPL-MCNC: 20 MG/DL (ref 7–22)
CALCIUM SERPL-MCNC: 8.2 MG/DL (ref 8.5–10.5)
CHLORIDE SERPL-SCNC: 108 MEQ/L (ref 98–111)
CO2 SERPL-SCNC: 17 MEQ/L (ref 23–33)
CREAT SERPL-MCNC: 1.2 MG/DL (ref 0.4–1.2)
DEPRECATED RDW RBC AUTO: 47.5 FL (ref 35–45)
EOSINOPHIL NFR BLD AUTO: 3.8 %
EOSINOPHILS ABSOLUTE: 0.2 THOU/MM3 (ref 0–0.4)
ERYTHROCYTE [DISTWIDTH] IN BLOOD BY AUTOMATED COUNT: 13.5 % (ref 11.5–14.5)
GFR SERPL CREATININE-BSD FRML MDRD: 44 ML/MIN/1.73M2
GLUCOSE BLD STRIP.AUTO-MCNC: 124 MG/DL (ref 70–108)
GLUCOSE BLD STRIP.AUTO-MCNC: 128 MG/DL (ref 70–108)
GLUCOSE BLD STRIP.AUTO-MCNC: 278 MG/DL (ref 70–108)
GLUCOSE BLD STRIP.AUTO-MCNC: 96 MG/DL (ref 70–108)
GLUCOSE SERPL-MCNC: 148 MG/DL (ref 70–108)
HCT VFR BLD AUTO: 40.7 % (ref 37–47)
HGB BLD-MCNC: 13.1 GM/DL (ref 12–16)
IMM GRANULOCYTES # BLD AUTO: 0.02 THOU/MM3 (ref 0–0.07)
IMM GRANULOCYTES NFR BLD AUTO: 0.4 %
KCT BLD-ACNC: 168 SECONDS (ref 1–150)
KCT BLD-ACNC: 255 SECONDS (ref 1–150)
LACTIC ACID, SEPSIS: 1.1 MMOL/L (ref 0.5–1.9)
LYMPHOCYTES ABSOLUTE: 1.1 THOU/MM3 (ref 1–4.8)
LYMPHOCYTES NFR BLD AUTO: 21.2 %
MCH RBC QN AUTO: 31.7 PG (ref 26–33)
MCHC RBC AUTO-ENTMCNC: 32.2 GM/DL (ref 32.2–35.5)
MCV RBC AUTO: 98.5 FL (ref 81–99)
MONOCYTES ABSOLUTE: 0.6 THOU/MM3 (ref 0.4–1.3)
MONOCYTES NFR BLD AUTO: 10.5 %
NEUTROPHILS NFR BLD AUTO: 63.2 %
NRBC BLD AUTO-RTO: 0 /100 WBC
OSMOLALITY SERPL CALC.SUM OF ELEC: 284.8 MOSMOL/KG (ref 275–300)
PLATELET # BLD AUTO: 227 THOU/MM3 (ref 130–400)
PMV BLD AUTO: 9.7 FL (ref 9.4–12.4)
POTASSIUM SERPL-SCNC: 4.7 MEQ/L (ref 3.5–5.2)
POTASSIUM SERPL-SCNC: 4.7 MEQ/L (ref 3.5–5.2)
RBC # BLD AUTO: 4.13 MILL/MM3 (ref 4.2–5.4)
SEGMENTED NEUTROPHILS ABSOLUTE COUNT: 3.3 THOU/MM3 (ref 1.8–7.7)
SODIUM SERPL-SCNC: 140 MEQ/L (ref 135–145)
VANCOMYCIN SERPL-MCNC: 10.8 UG/ML (ref 0.1–39.9)
WBC # BLD AUTO: 5.3 THOU/MM3 (ref 4.8–10.8)

## 2024-04-15 PROCEDURE — 83605 ASSAY OF LACTIC ACID: CPT

## 2024-04-15 PROCEDURE — 1200000003 HC TELEMETRY R&B

## 2024-04-15 PROCEDURE — 6360000002 HC RX W HCPCS: Performed by: PHYSICIAN ASSISTANT

## 2024-04-15 PROCEDURE — 80048 BASIC METABOLIC PNL TOTAL CA: CPT

## 2024-04-15 PROCEDURE — 2580000003 HC RX 258: Performed by: INTERNAL MEDICINE

## 2024-04-15 PROCEDURE — 75630 X-RAY AORTA LEG ARTERIES: CPT

## 2024-04-15 PROCEDURE — 37224 PR REVSC OPN/PRG FEM/POP W/ANGIOPLASTY UNI: CPT | Performed by: INTERNAL MEDICINE

## 2024-04-15 PROCEDURE — 37224 HC PLASTY UNI FEMPOP: CPT

## 2024-04-15 PROCEDURE — 2580000003 HC RX 258: Performed by: PHYSICIAN ASSISTANT

## 2024-04-15 PROCEDURE — 75625 CONTRAST EXAM ABDOMINL AORTA: CPT | Performed by: INTERNAL MEDICINE

## 2024-04-15 PROCEDURE — 75716 ARTERY X-RAYS ARMS/LEGS: CPT | Performed by: INTERNAL MEDICINE

## 2024-04-15 PROCEDURE — 2709999900 IR AORTAGRAM ABDOMINAL SERIALOGRAM

## 2024-04-15 PROCEDURE — 99223 1ST HOSP IP/OBS HIGH 75: CPT | Performed by: INTERNAL MEDICINE

## 2024-04-15 PROCEDURE — 75774 ARTERY X-RAY EACH VESSEL: CPT

## 2024-04-15 PROCEDURE — 6360000002 HC RX W HCPCS: Performed by: INTERNAL MEDICINE

## 2024-04-15 PROCEDURE — 2500000003 HC RX 250 WO HCPCS: Performed by: INTERNAL MEDICINE

## 2024-04-15 PROCEDURE — 76770 US EXAM ABDO BACK WALL COMP: CPT

## 2024-04-15 PROCEDURE — 36415 COLL VENOUS BLD VENIPUNCTURE: CPT

## 2024-04-15 PROCEDURE — 99232 SBSQ HOSP IP/OBS MODERATE 35: CPT | Performed by: INTERNAL MEDICINE

## 2024-04-15 PROCEDURE — 85025 COMPLETE CBC W/AUTO DIFF WBC: CPT

## 2024-04-15 PROCEDURE — 85347 COAGULATION TIME ACTIVATED: CPT

## 2024-04-15 PROCEDURE — 6360000004 HC RX CONTRAST MEDICATION: Performed by: INTERNAL MEDICINE

## 2024-04-15 PROCEDURE — 80202 ASSAY OF VANCOMYCIN: CPT

## 2024-04-15 PROCEDURE — 82948 REAGENT STRIP/BLOOD GLUCOSE: CPT

## 2024-04-15 PROCEDURE — 6370000000 HC RX 637 (ALT 250 FOR IP): Performed by: PHYSICIAN ASSISTANT

## 2024-04-15 PROCEDURE — 6370000000 HC RX 637 (ALT 250 FOR IP): Performed by: INTERNAL MEDICINE

## 2024-04-15 RX ORDER — FENTANYL CITRATE 50 UG/ML
INJECTION, SOLUTION INTRAMUSCULAR; INTRAVENOUS PRN
Status: COMPLETED | OUTPATIENT
Start: 2024-04-15 | End: 2024-04-15

## 2024-04-15 RX ORDER — HEPARIN SODIUM 1000 [USP'U]/ML
INJECTION, SOLUTION INTRAVENOUS; SUBCUTANEOUS PRN
Status: COMPLETED | OUTPATIENT
Start: 2024-04-15 | End: 2024-04-15

## 2024-04-15 RX ORDER — SODIUM CHLORIDE 9 MG/ML
INJECTION, SOLUTION INTRAVENOUS PRN
Status: DISCONTINUED | OUTPATIENT
Start: 2024-04-15 | End: 2024-04-20 | Stop reason: HOSPADM

## 2024-04-15 RX ORDER — PROTAMINE SULFATE 10 MG/ML
INJECTION, SOLUTION INTRAVENOUS PRN
Status: COMPLETED | OUTPATIENT
Start: 2024-04-15 | End: 2024-04-15

## 2024-04-15 RX ORDER — MIDAZOLAM HYDROCHLORIDE 1 MG/ML
INJECTION INTRAMUSCULAR; INTRAVENOUS PRN
Status: COMPLETED | OUTPATIENT
Start: 2024-04-15 | End: 2024-04-15

## 2024-04-15 RX ORDER — LIDOCAINE HYDROCHLORIDE 20 MG/ML
INJECTION, SOLUTION INFILTRATION; PERINEURAL PRN
Status: COMPLETED | OUTPATIENT
Start: 2024-04-15 | End: 2024-04-15

## 2024-04-15 RX ORDER — CLOPIDOGREL BISULFATE 75 MG/1
75 TABLET ORAL DAILY
Status: DISCONTINUED | OUTPATIENT
Start: 2024-04-16 | End: 2024-04-20 | Stop reason: HOSPADM

## 2024-04-15 RX ORDER — ACETAMINOPHEN 325 MG/1
650 TABLET ORAL EVERY 4 HOURS PRN
Status: DISCONTINUED | OUTPATIENT
Start: 2024-04-15 | End: 2024-04-18

## 2024-04-15 RX ORDER — CLOPIDOGREL BISULFATE 75 MG/1
TABLET ORAL PRN
Status: COMPLETED | OUTPATIENT
Start: 2024-04-15 | End: 2024-04-15

## 2024-04-15 RX ORDER — SODIUM CHLORIDE 0.9 % (FLUSH) 0.9 %
5-40 SYRINGE (ML) INJECTION EVERY 12 HOURS SCHEDULED
Status: DISCONTINUED | OUTPATIENT
Start: 2024-04-15 | End: 2024-04-20 | Stop reason: HOSPADM

## 2024-04-15 RX ORDER — SODIUM CHLORIDE 9 MG/ML
INJECTION, SOLUTION INTRAVENOUS CONTINUOUS
Status: ACTIVE | OUTPATIENT
Start: 2024-04-15 | End: 2024-04-15

## 2024-04-15 RX ORDER — SODIUM CHLORIDE 0.9 % (FLUSH) 0.9 %
5-40 SYRINGE (ML) INJECTION PRN
Status: DISCONTINUED | OUTPATIENT
Start: 2024-04-15 | End: 2024-04-20 | Stop reason: HOSPADM

## 2024-04-15 RX ADMIN — ISOSORBIDE MONONITRATE 30 MG: 30 TABLET, EXTENDED RELEASE ORAL at 10:20

## 2024-04-15 RX ADMIN — METRONIDAZOLE 500 MG: 500 INJECTION, SOLUTION INTRAVENOUS at 02:00

## 2024-04-15 RX ADMIN — MIRTAZAPINE 15 MG: 15 TABLET, FILM COATED ORAL at 21:15

## 2024-04-15 RX ADMIN — CARVEDILOL 3.12 MG: 3.12 TABLET, FILM COATED ORAL at 09:30

## 2024-04-15 RX ADMIN — METRONIDAZOLE 500 MG: 500 INJECTION, SOLUTION INTRAVENOUS at 18:01

## 2024-04-15 RX ADMIN — FERROUS SULFATE TAB 325 MG (65 MG ELEMENTAL FE) 325 MG: 325 (65 FE) TAB at 16:43

## 2024-04-15 RX ADMIN — HEPARIN SODIUM 5000 UNITS: 5000 INJECTION INTRAVENOUS; SUBCUTANEOUS at 05:33

## 2024-04-15 RX ADMIN — FERROUS SULFATE TAB 325 MG (65 MG ELEMENTAL FE) 325 MG: 325 (65 FE) TAB at 10:20

## 2024-04-15 RX ADMIN — Medication 25 MG: at 11:41

## 2024-04-15 RX ADMIN — VANCOMYCIN HYDROCHLORIDE 750 MG: 1 INJECTION, POWDER, LYOPHILIZED, FOR SOLUTION INTRAVENOUS at 21:13

## 2024-04-15 RX ADMIN — ASPIRIN 81 MG 81 MG: 81 TABLET ORAL at 09:30

## 2024-04-15 RX ADMIN — FENTANYL CITRATE 50 MCG: 50 INJECTION, SOLUTION INTRAMUSCULAR; INTRAVENOUS at 10:56

## 2024-04-15 RX ADMIN — METRONIDAZOLE 500 MG: 500 INJECTION, SOLUTION INTRAVENOUS at 10:04

## 2024-04-15 RX ADMIN — HEPARIN SODIUM 5000 UNITS: 5000 INJECTION INTRAVENOUS; SUBCUTANEOUS at 21:14

## 2024-04-15 RX ADMIN — INSULIN LISPRO 2 UNITS: 100 INJECTION, SOLUTION INTRAVENOUS; SUBCUTANEOUS at 16:49

## 2024-04-15 RX ADMIN — HEPARIN SODIUM 5000 UNITS: 1000 INJECTION INTRAVENOUS; SUBCUTANEOUS at 11:14

## 2024-04-15 RX ADMIN — SODIUM CHLORIDE: 9 INJECTION, SOLUTION INTRAVENOUS at 05:31

## 2024-04-15 RX ADMIN — CLOPIDOGREL BISULFATE 600 MG: 75 TABLET, FILM COATED ORAL at 12:20

## 2024-04-15 RX ADMIN — FLUOXETINE 10 MG: 10 CAPSULE ORAL at 10:20

## 2024-04-15 RX ADMIN — LIDOCAINE HYDROCHLORIDE 20 ML: 20 INJECTION, SOLUTION INFILTRATION; PERINEURAL at 11:02

## 2024-04-15 RX ADMIN — MIDAZOLAM 1 MG: 1 INJECTION INTRAMUSCULAR; INTRAVENOUS at 10:56

## 2024-04-15 RX ADMIN — CARVEDILOL 3.12 MG: 3.12 TABLET, FILM COATED ORAL at 16:43

## 2024-04-15 RX ADMIN — ATORVASTATIN CALCIUM 20 MG: 20 TABLET, FILM COATED ORAL at 21:15

## 2024-04-15 RX ADMIN — HEPARIN SODIUM 5000 UNITS: 5000 INJECTION INTRAVENOUS; SUBCUTANEOUS at 13:31

## 2024-04-15 RX ADMIN — SODIUM CHLORIDE: 9 INJECTION, SOLUTION INTRAVENOUS at 12:42

## 2024-04-15 RX ADMIN — CEFEPIME 1000 MG: 1 INJECTION, POWDER, FOR SOLUTION INTRAMUSCULAR; INTRAVENOUS at 17:14

## 2024-04-15 RX ADMIN — CEFEPIME 1000 MG: 1 INJECTION, POWDER, FOR SOLUTION INTRAMUSCULAR; INTRAVENOUS at 05:30

## 2024-04-15 RX ADMIN — IOPAMIDOL 75 ML: 612 INJECTION, SOLUTION INTRAVENOUS at 11:38

## 2024-04-15 ASSESSMENT — PAIN SCALES - GENERAL
PAINLEVEL_OUTOF10: 0

## 2024-04-15 NOTE — CONSULTS
[facility]  Cardiology Consult        Patient:  Shelli Herndon  YOB: 1935  MRN: [id number]     Acct: [id number]    PCP: Gurmeet Reilly MD    Date of Admission: 4/14/2024      Reason for Consultation:  PAD, CLI, Need for peripheral angiogram      History Of Present Illness:    88 y.o. pleasant female with history of diabetes, CAD status post PCI, HFrEF, CKD, hypertension who presented to the hospital with left second digit wound.  She was evaluated by podiatry and imaging showed osteomyelitis.  Wound cultures were positive for MRSA and Enterobacter.  She underwent testing at [facility] which showed severely abnormal ABIs with 0.3 bilaterally.  According to family she was awaiting vascular consult which did not take place.  Cardiovascular team was consulted for possible peripheral angiogram and angioplasty to improve blood flow prior to possible amputation of the second digit on the left foot.  Patient does not ambulate much due to questionable CHF.    All labs, EKG's, diagnostic testing and images as well as cardiac cath, stress testing were reviewed during this encounter.    Past Medical History:          Diagnosis Date    CAD (coronary artery disease)     heart attack    Diabetes mellitus (HCC)     Heart attack (HCC)     Hyperlipidemia     Hypertension        Past Surgical History:          Procedure Laterality Date    CARDIAC CATHETERIZATION      with stenting    CHOLECYSTECTOMY      CORONARY ANGIOPLASTY WITH STENT PLACEMENT      HYSTERECTOMY         Medications Prior to Admission:      Prior to Admission medications    Medication Sig Start Date End Date Taking? Authorizing Provider   carvedilol (COREG) 3.125 MG tablet Take 1 tablet by mouth 2 times daily (with meals)   Yes Susie Aguilar MD   sacubitril-valsartan (ENTRESTO) 24-26 MG per tablet Take 1 tablet by mouth 2 times daily   Yes ProviderSusie MD   ferrous fumarate-vitamin c

## 2024-04-15 NOTE — CONSULTS
5 MG tablet Take 5 mg by mouth 2 times daily (before meals).      ProviderSusie MD   sitaGLIPtan (JANUVIA) 50 MG tablet Take 2 tablets by mouth daily    Susie Aguilar MD   pioglitazone (ACTOS) 15 MG tablet Take 15 mg by mouth daily.    Patient not taking: Reported on 4/14/2024    Susie Aguilar MD   atorvastatin (LIPITOR) 10 MG tablet Take 2 tablets by mouth daily    Susie Aguilar MD   propranolol (INDERAL LA) 120 MG CR capsule Take 120 mg by mouth daily.    Patient not taking: Reported on 4/14/2024    Susie Aguilar MD   aspirin 325 MG EC tablet Take 81 mg by mouth daily    Susie Aguilar MD     Allergies: Patient has no known allergies.  IP meds : Scheduled Meds:   sodium chloride flush  5-40 mL IntraVENous 2 times per day    heparin (porcine)  5,000 Units SubCUTAneous 3 times per day    insulin lispro  0-4 Units SubCUTAneous TID     insulin lispro  0-4 Units SubCUTAneous Nightly    [Held by provider] sacubitril-valsartan  1 tablet Oral BID    carvedilol  3.125 mg Oral BID     [START ON 4/15/2024] aspirin  81 mg Oral Daily    [START ON 4/15/2024] isosorbide mononitrate  30 mg Oral Daily    atorvastatin  20 mg Oral Nightly    ferrous sulfate  325 mg Oral BID WC    [Held by provider] bumetanide  1 mg Oral Q MWF    [Held by provider] potassium bicarb-citric acid  10 mEq Oral Q MWF    [START ON 4/15/2024] FLUoxetine  10 mg Oral Daily    mirtazapine  15 mg Oral Nightly    sodium chloride  500 mL IntraVENous Once    metroNIDAZOLE  500 mg IntraVENous Q8H    vancomycin (VANCOCIN) intermittent dosing (placeholder)   Other RX Placeholder    vancomycin  1,250 mg IntraVENous Once    [START ON 4/15/2024] cefepime  1,000 mg IntraVENous Q12H     Continuous Infusions:   sodium chloride      dextrose         Review of Systems  Review of Systems   Constitutional:  Negative for chills, fatigue and fever.   HENT:  Negative for ear pain and sore throat.    Eyes: Negative.  Negative for    Temp 97.9 °F (36.6 °C) (Oral)   Resp 16   Ht 1.473 m (4' 10\")   Wt 58.1 kg (128 lb)   SpO2 96%   BMI 26.75 kg/m²   Labs, Radiology and Tests :  CBC:   Recent Labs     04/14/24  1335   WBC 7.3   HGB 14.0   HCT 43.9        CMP:  Recent Labs     04/14/24  1335   *   K 5.3*   CL 97*   CO2 22*   BUN 22   CREATININE 1.3*   GLUCOSE 273*   CALCIUM 9.0     Hepatic:   Recent Labs     04/14/24  1335   LABALBU 3.4*   AST 16   ALT 12   BILITOT 0.4   ALKPHOS 145*       Radiology : X-ray of the foot consistent with osteomyelitis    Old lab data have been reviewed and noted patient's baseline creatinine is around 1.3 at least since 2022    Other / Echocardiogram: EF 55% in 2012    Assessment and Plan:  Renal -patient appears to be having CKD stage III mostly due to senile nephrosclerosis and longstanding hypertension and diabetes.  She is also on multiple nephrotoxic agents.  Cannot rule out mild PEBBLES at this time overall appears to be stable close to baseline  She is having possible osteomyelitis and being planned for possible arteriogram.  Moderate risk but okay to proceed from the renal standpoint discussed with the patient she is in somewhat of an agreement  Meanwhile continue to hold the Bumex and Entresto and will give some IV hydration  Meanwhile will recheck a urinalysis and renal ultrasound scan.  Electrolytes -mild hyponatremia follow for now hydrate and follow  Mild hyperkalemia secondary to possible Entresto low potassium diet  Essential hypertension running reasonable  Osteomyelitis of the left second toe seen by podiatry cardiology has been consulted for possible arteriogram okay from renal standpoint to proceed we will hydrate in anticipation for the procedure.  Meds reviewed and discussed with patient    Duc Dee MD  [facility]    This report has been created using voice recognition software. It may contain minor errors which are inherent in voice recognition

## 2024-04-15 NOTE — BRIEF OP NOTE
[facility]  Sedation/Analgesia Post Sedation Record        Pt Name: Shelli Herndon  MRN: [id number]  YOB: 1935  Procedure Performed By: Paramjit Marte MD MD, CHERI, KATHYA, CHELSEY  Primary Care Physician: Gurmeet Reilly MD        POST-PROCEDURE    Physicians/Assistants: Paramjit Marte MD MD, CHERI, KATHYA, CHELSEY                                 Sedation/Anesthesia:  Local Anesthesia and IV Conscious Sedation with continuous O2 monitoring    Estimated Blood Loss: 10 cc     Specimens Removed:  [x]None []Other:      Disposition of Specimen:  []Pathology []Other        Complications:   [x]None Immediate []Other:     Procedure Performed:    Lower ext angiogram   PTA/DCB to totally occluded Left SFA  Establishment of AVELINA 3 flow to the left foot       Post Procedure Diagnosis/Findings:  Peripheral Artery Disease          Recommendations:    Monitor groin site on right  Podiatry follow up for left toe amputation  IV abx per primary team  Needs angioplasty of right side in 2-4 weeks  Discussed with patient and family              Paramjit Marte MD MD, CHERI, KATHYA, CHELSEY  Electronically signed 4/15/2024 at 12:07 PM

## 2024-04-15 NOTE — CONSULTS
CONSULTATION NOTE :ID       Patient - Shelli Herndon,  Age - 88 y.o.    - 1935      Room Number - 5K-25/025-A   MRN -  [id number]   [facility] - [id number]  Date of Admission -  2024 12:45 PM  Patient's PCP: Gurmeet Reilly MD     Requesting Physician: Mis Sy PA-C    REASON FOR CONSULTATION   Abx guidance with L toe OM (planning amputation)  CHIEF COMPLAINT   Macerated and reddened left second toe    HISTORY OF PRESENT ILLNESS     This is a very pleasant 88 y.o. female w/ PMHx of DM2, CAD, CKD3, MI, HLD, HTN who was admitted to [facility] ED on  with CC of macerated and reddened left second toe.  States it has been this way for the last day or 2.  Patient endorses walking on her hammertoes often.  L second toe recently diagnosed with MRSA.  Outpatient arterial Doppler showed poor circulation.  Was working with cardiology for stenting.  Says pain not too bad 2/2 her neuropathy.  Rates it 5/10.  No fevers, chills, N/V, purulent drainage, SOB, CP.  Sees Dr. Loredo.  Has been taking Bactrim x 2 weeks.  Increased swelling and redness at site and inability to bear weight on foot.  Podiatry currently planning amputation of toe.     4/15: Pt seen at bedside today. She likely has underlying dementia. Pt was told she'd be here for a few days by nursing and other practitioners. She asked me if she could take oral meds when she was discharged today. I explained to her that she has a toe infection that would require surgical treatment. She states she had a bout of incontinence and had to be cleaned up by nursing. No other complaints.     Labs notable for CO2 17 BUN 20 Creat 1.2 Folate 44. CRP 6.97 LA 2.7 WBC 7.3. BC x2 neg at 24 hours.     X-ray left foot  showed no fracture or dislocation, possible osseous erosion at distal second toe suspicious for osteomyelitis.    4/15: Peripheral angiogram/angioplasty showed patent iliac arteries, femoral arteries, right-sided SFA totally

## 2024-04-15 NOTE — PROGRESS NOTES
1040 Pt in specials radiology for left leg angiogram with possible intervention. Discussed procedure with pt and pt verbalizes understanding.  Pt has dressing intact to top of second toe. Denies any pain at this time.  1045 Moved to table and attached to monitor.  1055 Dr Marte here.  1059 SPO2 86%. O2 2 liters per nasal cannula applied. Pt arouses to name easily.  1102 5 fr sheath inserted in right femoral artery.  1122 Angioplasty of left distal SFA and popliteal artery with 4 x 120 Patterson 35 balloon.  1125 Angioplasty of same vessel with 5 x 250 IN.PACT Admiral balloon.  1131 ACT drawn.  1136 . Dr Marte notified.  1137 Angiogram of right femoral artery.  1138 Procedure complete. Prepping for sheath removal.  1145 O2 off.  1153 ACT drawn.  1157 .  1200 Sheath in right femoral artery pulled and manual pressure applied per J Karthik RT. No bleeding noted.  1204 Report called to Ghazala BLUE.  1215 Manual pressure removed. No bleeding noted. 4 x 4  with op-site dressing applied.   1217 Pt positioned on bed for comfort.  1221 Pt transferred to 5K per bed. Report updated to RN.

## 2024-04-15 NOTE — PROGRESS NOTES
[facility]  Sedation/Analgesia History & Physical    Pt Name: Shelli Herndon  Account number: [id number]  MRN: [id number]  YOB: 1935  Provider Performing Procedure: Paramjit Marte MD MD [facility]  Primary Care Physician: Gurmeet Reilly MD  Date: 4/15/2024    PRE-PROCEDURE    Code Status: FULL CODE  Brief History/Pre-Procedure Diagnosis: CLI/PAD    Consent: : I have discussed with the patient risks, benefits, and alternatives (and relevant risks, benefits, and side effects related to alternatives or not receiving care), and likelihood of the success.   The patient and/or representative appear to understand and agree to proceed.  The discussion encompasses risks, benefits, and side effects related to the alternatives and the risks related to not receiving the proposed care, treatment, and services.       PLANNED PROCEDURE   []Cath  []PCI                []Pacemaker/AICD  []CARIE             []Cardioversion [x]Peripheral angiography/PTA  []Other:      VITAL SIGNS   Vitals:    04/15/24 1200   BP: (!) 117/45   Pulse: 66   Resp: 14   Temp:    SpO2: 95%       PHYSICAL:   General: No acute distress  HEENT:  Unremarkable for age  Neck: without increased JVD, carotid pulses 2+ bilaterally without bruits  Heart: RRR, S1 & S2 WNL   Lungs: Clear to auscultation    Abdomen: BS present, without HSM, masses, or tenderness    Extremities: without C,C,E.  Pulses 2+ bilaterally  Mental Status: Alert & Oriented    SEDATION  Planned agent:[x]Midazolam []Meperidine []Sublimaze []Morphine  []Diazepam  [x]Other: fentanyl      ASA Classification:  []1 []2 [x]3 []4 []5  Class 1: A normal healthy patient  Class 2: Pt with mild to moderate systemic disease  Class 3: Severe systemic disease or disturbance  Class 4: Severe systemic disorders that are already life threatening.  Class 5: Moribund pt with little chances of survival, for more than 24 hours.  Mallampati I Airway Classification:   []1 []2 [x]3

## 2024-04-15 NOTE — PROGRESS NOTES
Pt admitted to  5K25 via wheelchair from ED.  Complaints: left foot wound.    iV site free of s/s of infection or infiltration. Vital signs obtained. Assessment and data collection initiated. Two nurse skin assessment performed by Leslie BLUE and Lina BLUE. Oriented to room. Policies and procedures for 5 explained. All questions answered with no further questions at this time. Fall prevention and safety brochure discussed with patient.  Bed alarm on. Call light in reach.Oriented to room.   Leslie Crain, RN, RN 4/15/2024 12:27 AM      Telephone Encounter by Jaclyn Woodward CMA at 05/11/17 04:56 PM     Author:  Jaclyn Woodward CMA Service:  (none) Author Type:  Certified Medical Assistant     Filed:  05/11/17 04:57 PM Encounter Date:  5/11/2017 Status:  Signed     :  Jaclyn Woodward CMA (Certified Medical Assistant)            Patient notified.  Fabienne verbalized understanding of information given.[NL1.1T]        Revision History        User Key Date/Time User Provider Type Action    > NL1.1 05/11/17 04:57 PM Jaclyn Woodward CMA Certified Medical Assistant Sign    T - Template

## 2024-04-15 NOTE — PROGRESS NOTES
[facility]   Nephrology progress note  4/15/2024, 10:51 AM      Pt Name:    Shelli Herndon  MRN:     [id number]     YOB: 1935  Admit Date:    4/14/2024 12:45 PM    Chief Complaint: Nephrology following for CKD/clearance for arteriogram.    Subjective:  Patient seen and examined  No chest pain or shortness of breath  No other complaints decent urine output    Objective:  24HR INTAKE/OUTPUT:    Intake/Output Summary (Last 24 hours) at 4/15/2024 1051  Last data filed at 4/15/2024 0444  Gross per 24 hour   Intake 100 ml   Output 500 ml   Net -400 ml      Admission weight: 58.1 kg (128 lb)  Wt Readings from Last 3 Encounters:   04/14/24 58.1 kg (128 lb)   01/17/19 58.7 kg (129 lb 6.4 oz)   08/30/16 57.8 kg (127 lb 6.4 oz)        Vitals :   Vitals:    04/14/24 2159 04/15/24 0530 04/15/24 0749 04/15/24 1049   BP: (!) 148/67 (!) 151/67 (!) 151/62 (!) 160/62   Pulse: 67 75 69 75   Resp: 16 17 16 16   Temp: 98.7 °F (37.1 °C) 98.2 °F (36.8 °C) 98.6 °F (37 °C)    TempSrc: Oral Oral Oral    SpO2: 100% 99% 98% 97%   Weight:       Height:           Physical examination  General Appearance:  Well developed. No distress  Mouth/Throat:  Oral mucosa moist  Neck:  Supple, no JVD  Lungs:  Breath sounds: clear  Heart::  S1,S2 heard  Abdomen:  Soft, non - tender  Musculoskeletal: Wound noted on the left foot    Medications:  Infusion:    sodium chloride      dextrose      sodium chloride 60 mL/hr at 04/15/24 0531     Meds:    sodium chloride flush  5-40 mL IntraVENous 2 times per day    heparin (porcine)  5,000 Units SubCUTAneous 3 times per day    insulin lispro  0-4 Units SubCUTAneous TID     insulin lispro  0-4 Units SubCUTAneous Nightly    [Held by provider] sacubitril-valsartan  1 tablet Oral BID    carvedilol  3.125 mg Oral BID WC    aspirin  81 mg Oral Daily    isosorbide mononitrate  30 mg Oral Daily    atorvastatin  20 mg Oral Nightly    ferrous sulfate  325 mg Oral BID WC    [Held by provider]

## 2024-04-15 NOTE — PROGRESS NOTES
Hospitalist Progress Note      Patient:  Shelli Herndon    Unit/Bed:5K-25/025-A  YOB: 1935  MRN: [id number]   Acct: [id number]   PCP: Gurmeet Reilly MD  Date of Admission: 4/14/2024    Assessment/Plan:    OM of the L second digit of the LLE  -Noted on x-ray.  -Follows with podiatry outpatient.  Noted to have MRSA and Enterobacter cloacae on recent culture on 3/28/2024.  -Podiatry following.  Planning for amputation of digit.  -Consult cardiology for consideration of peripheral artery intervention per podiatry recommendation.  -Growing Enterobacter, vancomycin due to MRSA. Add anaerobic coverage with Flagyl - ID consulted to assist with abx guidance    PAD  -Recent arterial Doppler with chronic occlusion of the right mid SFA, severely decreased bilateral ABIs, moderate stenosis of the left distal SFA  -Cardiology consult -underwent successful revascularization of totally occluded SFA and popliteal segment left side with angioplasty 4/15  -Continue DAPT    Non-insulin-dependent type 2 diabetes mellitus  -Hold oral meds. Carb controlled diet. Accuchecks. Low dose sliding scale.    Hyponatremia  -131.  Most recently 134 on 7/24/2023.  -Small bolus of normal saline given in the emergency department- resolved    Hyperkalemia  -5.3.  Likely secondary to CKD and ARB use as well as potassium supplementation.  Hold ARB.  Hold potassium supplementation.  -Recheck BMP with resolution     Lactic acidosis  -2.7.  Small bolus of IV fluids given.  -Recheck lactic resolved    HFrEF not in acute exacerbation  Echocardiogram not available.  Order placed to obtain [facility] from [facility].  Diuretic regimen includes Bumex 1 mg 3 times weekly.  Hold Bumex at this time.  Small IV fluid bolus given in the emergency department given lactic acidosis.  Daily weights.  Ins and outs.  Low-sodium diet.  Fluid restriction.    CKD stage III, unspecified  stage  -Creatinine 1.3, appears around patient's baseline.  -Creatinine clearance noted to be 25.  -Consult placed to nephrology for further assistance given that the patient will likely need IV contrast for for their evaluation of her PAD.  -Continue to hold Bumex and Entresto    Hypertension  -Continue home regimen.  Holding Entresto secondary to hyperkalemia.    Hyperlipidemia  -Continue Lipitor    Coronary artery disease  -Reported history of PCI/stenting.  -Continue aspirin.  -Order placed to obtain records from [facility].    Chief Complaint: Toe wound     Initial H and P:-    \"Shelli Herndon is a 88 y.o. female with a history of peripheral artery disease, non-insulin-dependent type 2 diabetes mellitus, HFrEF, CKD, hypertension, hyperlipidemia, coronary artery disease, and left second digit foot wound who presented to [facility] with chief complaint of left second digit foot wound.  The patient has been following with podiatry outpatient.  She recently had the left digit wound cultured and it was positive for MRSA and Enterobacter cloacae.  She was supposed to see cardiology in about 10 days for consideration of intervention to the lower extremities given peripheral arterial disease which was noted on an ultrasound, but the wound became worse so the patient sought treatment in the emergency department.  X-rays in the emergency department were notable for findings consistent with osteomyelitis.  The patient is being admitted for further evaluation.  The patient denies any discomfort to the lower extremity.  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  No fevers or chills.  The patient does appear to have some underlying CKD, although she and her daughter were not aware of this.  She does not follow with a nephrologist.  The patient has no other complaints at this time.  She does take aspirin. \"    Subjective (past 24 hours):   4/15: Patient is lying in bed seen after angiogram which she tolerated well.  She has no complaints

## 2024-04-15 NOTE — PLAN OF CARE
Problem: Pain  Goal: Verbalizes/displays adequate comfort level or baseline comfort level  Outcome: Progressing  Flowsheets (Taken 4/14/2024 2159)  Verbalizes/displays adequate comfort level or baseline comfort level:   Encourage patient to monitor pain and request assistance   Assess pain using appropriate pain scale   Administer analgesics based on type and severity of pain and evaluate response   Implement non-pharmacological measures as appropriate and evaluate response     Problem: Safety - Adult  Goal: Free from fall injury  Outcome: Progressing   Fall assessment completed. Patient using call light appropriately to call for assistance with ambulation to bathroom.  Personal items within reach. Patient is also compliant with use of non-skid slippers.  Placed bed in low position. Bed alarm turned on.    Problem: Skin/Tissue Integrity - Adult  Goal: Incisions, wounds, or drain sites healing without S/S of infection  Outcome: Progressing  Flowsheets (Taken 4/14/2024 2159)  Incisions, Wounds, or Drain Sites Healing Without Sign and Symptoms of Infection:   ADMISSION and DAILY: Assess and document risk factors for pressure ulcer development   TWICE DAILY: Assess and document skin integrity   TWICE DAILY: Assess and document dressing/incision, wound bed, drain sites and surrounding tissue   Implement wound care per orders     Problem: Infection - Adult  Goal: Absence of infection during hospitalization  Outcome: Progressing  Flowsheets (Taken 4/14/2024 2159)  Absence of infection during hospitalization:   Assess and monitor for signs and symptoms of infection   Monitor lab/diagnostic results   Monitor all insertion sites i.e., indwelling lines, tubes and drains   Administer medications as ordered   Instruct and encourage patient and family to use good hand hygiene technique     Problem: Discharge Planning  Goal: Discharge to home or other facility with appropriate resources  Outcome: Progressing  Flowsheets (Taken

## 2024-04-15 NOTE — PROCEDURES
[address] [address]                         CARDIAC CATHETERIZATION      PATIENT NAME: MK BLISS                : 1935  MED REC NO: [id number]                       ROOM: [facility]  ACCOUNT NO: [id number]                       ADMIT DATE: 2024  PROVIDER: Paramjit Marte MD    PERIPHERAL ANGIOGRAM/ANGIOPLASTY REPORT    DATE OF PROCEDURE:  04/15/2024      PROCEDURES PERFORMED:  Abdominal angiogram, bilateral lower extremity selective angiogram, revascularization of total superficial femoral artery on the left side/popliteal artery with IN.PACT Admiral drug-coated balloon.    INDICATIONS FOR STUDY:  Critical limb ischemia with ulcers of the second digit on the left foot.    DESCRIPTION OF PROCEDURE:  After informed consent was obtained, patient was brought to the special procedure laboratory in a fasting, nonsedated state.  Preprocedure time-out was performed.  2% lidocaine was used to anesthetize subcutaneous tissue overlying the right femoral artery.  Using ultrasound and fluoroscopy guidance, we were able to place a 5-Thai sheath in the right common femoral artery.  Once the sheath was in place, Omni Flush catheter along with Glidewire was used to get into the distal abdominal aorta once diluted contrast was injected and DSA images were obtained of bilateral extremities and iliofemoral system.  Distal abdominal aorta was patent with mild diffuse disease.  Bilateral iliac arteries were patent with mild diffuse disease.  Bilateral common femoral arteries were patent with mild diffuse disease.  The SFA on the right side was totally occluded from proximal segment and it reconstitutes in the distal segment.  On the left side, the SFA is patent in the proximal portion.  Mid to distal portion is totally occluded and it reconstitutes in the proximal popliteal segment.  The popliteal artery on the right side is patent with mild  diffuse disease whereas the left side is partially occluded, it reconstitutes in the mid segment of the left side.  Below the knee, there seems to be 2-vessel runoff to the foot with occluded peroneal artery on the right side and occluded posterior tibial artery on the left side.  Based on these angiographic films and the lesion of the second digit of the left foot, we decided to revascularize the occluded SFA on the left side.  The 5-Portuguese sheath was upsized and was used to go up and over to the contralateral side with the 6 x 40 cm A and O sheath.  An angled TrailBlazer 150 was used and the Glide Advantage wire was used to cross the totally occluded segment of the SFA and the popliteal segment.  Once we crossed this lesion, we ensured that we were in fact in true lumen by injecting some contrast.  After this, we used IV heparin and ACT was maintained in the 200 range.  A 4 x 120 Lebo balloon was first used to predilate the totally occluded segment and then a 5 x 250 IN.PACT Admiral drug-coated balloon angioplasty that was used in the popliteal as well as the SFA system to revascularize the system.  After this, repeat angiogram showed good AVELINA-3 flow throughout the entire left SFA as well as popliteal and both the below-the-knee vessels.  There is non flow-limiting dissections, but overall no significant abnormalities.  The sheath in the right groin was left in place to be pulled with manual hold as the patient will require right SFA revascularization in 3 to 4 weeks.  The patient tolerated the procedure well.  There were no immediate complications.  She will be transferred after sheath removal to her room.    SUMMARY:  Successful revascularization of totally occluded SFA and popliteal segment on the left side with IN.PACT Admiral drug-coated balloon angioplasty.    RECOMMENDATIONS:    1. DAPT with aspirin plus Plavix.  2. Monitor groin access site.  3. IV fluids to reduce contrast-induced nephropathy.  4.

## 2024-04-15 NOTE — CARE COORDINATION
Case Management Assessment Initial Evaluation    Date/Time of Evaluation: 4/15/2024 8:23 AM  Assessment Completed by: Sheila Jc RN    If patient is discharged prior to next notation, then this note serves as note for discharge by case management.    Patient Name: Shelli Herndon                   YOB: 1935  Diagnosis: Osteomyelitis (HCC) [M86.9]  Gangrene of toe of left foot (HCC) [I96]  Osteomyelitis of second toe of left foot (HCC) [M86.9]                   Date / Time: 2024 12:45 PM  Location: [address] [place] [address]     Patient Admission Status: Inpatient   Readmission Risk Low 0-14, Mod 15-19), High > 20: Readmission Risk Score: 15.2    Current PCP: Gurmeet Reilly MD    Additional Case Management Notes: To ED for evaluation of left second digit foot wound. Hospitalist, Nephrology, Cardiology and Podiatry following. ID to see. Angiogram this AM. Daily dressing changes. Daily weights. Telemetry. IVF@75 Asa. IV cefepime q12h. DM management. SQ heparin. IV flagyl q8h. IV vancomycin.      Procedures:   4/15: IR w/ Dr. Marte: LLE Angiogram    Imagin/14 XR Left Toe: No fracture or dislocation. 2. Possible osseous erosion at the distal second toe suspicious for osteomyelitis.  4/15 US Renal: Negative for obstructive uropathy. No suspicious renal mass. Small volume debris noted in the bladder lumen suggesting complex fluid.    Patient Goals/Plan/Treatment Preferences: Shelli down in IR at time of assessment, daughter/PORASHAD Willson states patient has dementia at baseline. Plan for Shelli to return home w/ her daughter and granddaughter. She is mostly independently, but needs assistance via verbal cues. Has rollator. At this time, discharge needs unknown and pending decision for amputation (HH vs. Outpatient PT/OT) Declines SNF placement.      04/15/24 1120   Service Assessment   Patient Orientation Unable to Assess  (Patient at procedure; Baseline dementia)   Cognition Dementia / Early Alzheimer's   History

## 2024-04-15 NOTE — ED NOTES
Spoke to AUDREY Mills to approve pt transport to [facility] in stable condition.

## 2024-04-15 NOTE — PROGRESS NOTES
Pt is an 88y.o. female, propped up in bed visiting with her daughter, and a friend arriving during our visit-in 5K-25. Shelli is pleasant and talkative, sharing that she is having a problem toe looked at, with potential surgical intervention upcoming-they were to find out path-forward yet today.  provided active listening, encouragement, conversation around her home country, care she has received-which she lauded-and prayer; met with gratitude by Shelli and her daughter.     04/15/24 1453   Encounter Summary   Encounter Overview/Reason  Spiritual/Emotional Needs   Service Provided For: Patient and family together   Referral/Consult From: Multi-disciplinary team   Support System Children;Friends/neighbors   Last Encounter  04/15/24   Complexity of Encounter Moderate   Begin Time 0830   End Time  0841   Total Time Calculated 11 min   Spiritual/Emotional needs   Type Spiritual Support   Assessment/Intervention/Outcome   Assessment Calm;Coping;Hopeful;Peaceful   Intervention Active listening;Prayer (assurance of)/[place];Nurtured Hope;Explored/Affirmed feelings, thoughts, concerns;Discussed illness injury and it’s impact   Outcome Engaged in conversation;Expressed feelings, needs, and concerns;Expressed Gratitude;Receptive;Coping

## 2024-04-15 NOTE — PROGRESS NOTES
Podiatric Surgery Consult    SUBJECTIVE:  4/15:  Patient seen at bedside on behalf of Dr. Loredo. Patient states her left foot is still painful, she denies any nausea, fever, vomiting, chills, shortness of breath or chest pain. She is aware of the plan for vascular intervention, spoke to nephrology who will be running tests this AM to clear for IV contrast. No other acute concerns at this time.    HISTORY OF PRESENT ILLNESS:                The patient is a 88 y.o. female with significant past medical history of CAD, DM, heart attack, hyperlipidemia, hyptension who is being seen at bedside on behalf of Dr. Loredo . Patient states she has been followed by Dr. Sierra and Dr. Loredo outpatient.  Patient is been undergoing local wound care to the left second digit where previously at the distal tuft there is an open ulceration with local wound care has since resolved.  Due to patient's hallux valgus deformity they have been applying Betadine with interdigital gauze to alleviate the rubbing and pressure.  Patient is accompanied today by son and daughter in the room providing additional history.  They state that this wound was not as red and edematous on Wednesday when seen in office.  Due to increased redness and swelling of coming emergency department for further workup and evaluation.  Patient states that pain is 5/10.  Patient denies any nausea, fever, vomiting, chills, shortness of breath or chest pain.  No other acute concerns at this time.      Current Medications:    Current Facility-Administered Medications: sodium chloride flush 0.9 % injection 5-40 mL, 5-40 mL, IntraVENous, 2 times per day  sodium chloride flush 0.9 % injection 5-40 mL, 5-40 mL, IntraVENous, PRN  0.9 % sodium chloride infusion, , IntraVENous, PRN  ondansetron (ZOFRAN-ODT) disintegrating tablet 4 mg, 4 mg, Oral, Q8H PRN **OR** ondansetron (ZOFRAN) injection 4 mg, 4 mg, IntraVENous, Q6H PRN  polyethylene glycol (GLYCOLAX) packet 17 g, 17 g, Oral, Daily  Reason for Consult?     Answer:   Peripheral vascular disease, needs amputation of left lower extremity second digit     Order Specific Question:   Provider Contacted?     Answer:   Yes    Contact isolation     Standing Status:   Standing     Number of Occurrences:   1    Initiate Oxygen Therapy Protocol     Initiate oxygen therapy if the patient has 1) SpO2 is less than 90%, 2) Cyanosis, Chest Pain, Dyspnea, or Altered level of consciousness AND SpO2 checked and it is less than 90%, or 3) patient on Home oxygen.    To initiate oxygen therapy: nurse or RT enters Nasal cannula oxygen order using Per Protocol without Cosign order mode (if indication Home Oxygen then change L/min to same amount at home and change Wean to Room Air to No).    Notify provider if initiate oxygen therapy unless already on Home Oxygen.     Standing Status:   Standing     Number of Occurrences:   32961    POCT Glucose     Standing Status:   Standing     Number of Occurrences:   25    POCT Glucose     Repeat blood glucose 15 minutes following intervention.  If blood glucose is LESS THAN 70 mg/dL, repeat treatment and recheck blood glucose in 15 minutes x 2.  If blood glucose remains LESS THAN 70 mg/dL, call ordering provider for further instruction.   If patient experienced a hypoglycemic event in last 24 hours, obtain blood glucose at 0200.     Standing Status:   Standing     Number of Occurrences:   60334    Insert peripheral IV     Standing Status:   Standing     Number of Occurrences:   1    ADMIT TO INPATIENT     Standing Status:   Standing     Number of Occurrences:   1     Order Specific Question:   Discharge Plan:     Answer:   Home with Office Follow-up     Order Specific Question:   Telemetry/Cardiac Monitoring Required?     Answer:   Yes       Assessment and Plan  Principle  1.  Ulcer left second digit  2.  Osteomyelitis left second digit    - Patient initially examined and evaluated  - WBC 7.3  - CRP 6.97  - Albumin 3.4  - Lactic

## 2024-04-16 PROBLEM — E78.5 HYPERLIPIDEMIA LDL GOAL <50: Status: ACTIVE | Noted: 2024-04-16

## 2024-04-16 PROBLEM — Z98.890 S/P ANGIOGRAM OF EXTREMITY: Status: ACTIVE | Noted: 2024-04-16

## 2024-04-16 LAB
ANION GAP SERPL CALC-SCNC: 10 MEQ/L (ref 8–16)
BUN SERPL-MCNC: 16 MG/DL (ref 7–22)
CALCIUM SERPL-MCNC: 8 MG/DL (ref 8.5–10.5)
CHLORIDE SERPL-SCNC: 107 MEQ/L (ref 98–111)
CO2 SERPL-SCNC: 19 MEQ/L (ref 23–33)
CREAT SERPL-MCNC: 1 MG/DL (ref 0.4–1.2)
DEPRECATED RDW RBC AUTO: 50.4 FL (ref 35–45)
ERYTHROCYTE [DISTWIDTH] IN BLOOD BY AUTOMATED COUNT: 13.4 % (ref 11.5–14.5)
GFR SERPL CREATININE-BSD FRML MDRD: 55 ML/MIN/1.73M2
GLUCOSE BLD STRIP.AUTO-MCNC: 191 MG/DL (ref 70–108)
GLUCOSE BLD STRIP.AUTO-MCNC: 194 MG/DL (ref 70–108)
GLUCOSE BLD STRIP.AUTO-MCNC: 232 MG/DL (ref 70–108)
GLUCOSE BLD STRIP.AUTO-MCNC: 317 MG/DL (ref 70–108)
GLUCOSE SERPL-MCNC: 124 MG/DL (ref 70–108)
HCT VFR BLD AUTO: 41.7 % (ref 37–47)
HGB BLD-MCNC: 13 GM/DL (ref 12–16)
MCH RBC QN AUTO: 31.9 PG (ref 26–33)
MCHC RBC AUTO-ENTMCNC: 31.2 GM/DL (ref 32.2–35.5)
MCV RBC AUTO: 102.5 FL (ref 81–99)
PLATELET # BLD AUTO: 212 THOU/MM3 (ref 130–400)
PMV BLD AUTO: 10 FL (ref 9.4–12.4)
POTASSIUM SERPL-SCNC: 5 MEQ/L (ref 3.5–5.2)
RBC # BLD AUTO: 4.07 MILL/MM3 (ref 4.2–5.4)
SODIUM SERPL-SCNC: 136 MEQ/L (ref 135–145)
WBC # BLD AUTO: 5.8 THOU/MM3 (ref 4.8–10.8)

## 2024-04-16 PROCEDURE — 6370000000 HC RX 637 (ALT 250 FOR IP): Performed by: PHYSICIAN ASSISTANT

## 2024-04-16 PROCEDURE — 99232 SBSQ HOSP IP/OBS MODERATE 35: CPT | Performed by: INTERNAL MEDICINE

## 2024-04-16 PROCEDURE — 6360000002 HC RX W HCPCS: Performed by: PHYSICIAN ASSISTANT

## 2024-04-16 PROCEDURE — 2580000003 HC RX 258: Performed by: PHYSICIAN ASSISTANT

## 2024-04-16 PROCEDURE — 80202 ASSAY OF VANCOMYCIN: CPT

## 2024-04-16 PROCEDURE — 6370000000 HC RX 637 (ALT 250 FOR IP): Performed by: INTERNAL MEDICINE

## 2024-04-16 PROCEDURE — 82948 REAGENT STRIP/BLOOD GLUCOSE: CPT

## 2024-04-16 PROCEDURE — 85027 COMPLETE CBC AUTOMATED: CPT

## 2024-04-16 PROCEDURE — 80048 BASIC METABOLIC PNL TOTAL CA: CPT

## 2024-04-16 PROCEDURE — 99232 SBSQ HOSP IP/OBS MODERATE 35: CPT | Performed by: PHYSICIAN ASSISTANT

## 2024-04-16 PROCEDURE — 6370000000 HC RX 637 (ALT 250 FOR IP): Performed by: NURSE PRACTITIONER

## 2024-04-16 PROCEDURE — 36415 COLL VENOUS BLD VENIPUNCTURE: CPT

## 2024-04-16 PROCEDURE — 99232 SBSQ HOSP IP/OBS MODERATE 35: CPT | Performed by: NURSE PRACTITIONER

## 2024-04-16 PROCEDURE — 1200000003 HC TELEMETRY R&B

## 2024-04-16 PROCEDURE — 2580000003 HC RX 258: Performed by: INTERNAL MEDICINE

## 2024-04-16 RX ORDER — INSULIN LISPRO 100 [IU]/ML
0-4 INJECTION, SOLUTION INTRAVENOUS; SUBCUTANEOUS NIGHTLY
Status: DISCONTINUED | OUTPATIENT
Start: 2024-04-16 | End: 2024-04-20

## 2024-04-16 RX ORDER — ROSUVASTATIN CALCIUM 20 MG/1
20 TABLET, COATED ORAL NIGHTLY
Status: DISCONTINUED | OUTPATIENT
Start: 2024-04-16 | End: 2024-04-20 | Stop reason: HOSPADM

## 2024-04-16 RX ORDER — CLOPIDOGREL BISULFATE 75 MG/1
75 TABLET ORAL DAILY
Qty: 30 TABLET | Refills: 3 | Status: SHIPPED | OUTPATIENT
Start: 2024-04-17

## 2024-04-16 RX ORDER — ASPIRIN 81 MG/1
81 TABLET, CHEWABLE ORAL DAILY
Qty: 30 TABLET | Refills: 3 | Status: SHIPPED | OUTPATIENT
Start: 2024-04-17

## 2024-04-16 RX ORDER — ROSUVASTATIN CALCIUM 20 MG/1
20 TABLET, COATED ORAL NIGHTLY
Qty: 30 TABLET | Refills: 3 | Status: SHIPPED | OUTPATIENT
Start: 2024-04-16

## 2024-04-16 RX ORDER — INSULIN LISPRO 100 [IU]/ML
0-8 INJECTION, SOLUTION INTRAVENOUS; SUBCUTANEOUS
Status: DISCONTINUED | OUTPATIENT
Start: 2024-04-16 | End: 2024-04-20

## 2024-04-16 RX ADMIN — INSULIN LISPRO 4 UNITS: 100 INJECTION, SOLUTION INTRAVENOUS; SUBCUTANEOUS at 20:07

## 2024-04-16 RX ADMIN — METRONIDAZOLE 500 MG: 500 INJECTION, SOLUTION INTRAVENOUS at 20:14

## 2024-04-16 RX ADMIN — CARVEDILOL 3.12 MG: 3.12 TABLET, FILM COATED ORAL at 08:30

## 2024-04-16 RX ADMIN — HEPARIN SODIUM 5000 UNITS: 5000 INJECTION INTRAVENOUS; SUBCUTANEOUS at 05:03

## 2024-04-16 RX ADMIN — CEFEPIME 2000 MG: 2 INJECTION, POWDER, FOR SOLUTION INTRAVENOUS at 16:56

## 2024-04-16 RX ADMIN — METRONIDAZOLE 500 MG: 500 INJECTION, SOLUTION INTRAVENOUS at 11:51

## 2024-04-16 RX ADMIN — SODIUM CHLORIDE: 9 INJECTION, SOLUTION INTRAVENOUS at 13:38

## 2024-04-16 RX ADMIN — FERROUS SULFATE TAB 325 MG (65 MG ELEMENTAL FE) 325 MG: 325 (65 FE) TAB at 16:58

## 2024-04-16 RX ADMIN — ISOSORBIDE MONONITRATE 30 MG: 30 TABLET, EXTENDED RELEASE ORAL at 08:30

## 2024-04-16 RX ADMIN — ROSUVASTATIN CALCIUM 20 MG: 20 TABLET, FILM COATED ORAL at 20:06

## 2024-04-16 RX ADMIN — FLUOXETINE 10 MG: 10 CAPSULE ORAL at 08:30

## 2024-04-16 RX ADMIN — INSULIN LISPRO 1 UNITS: 100 INJECTION, SOLUTION INTRAVENOUS; SUBCUTANEOUS at 13:06

## 2024-04-16 RX ADMIN — METRONIDAZOLE 500 MG: 500 INJECTION, SOLUTION INTRAVENOUS at 02:10

## 2024-04-16 RX ADMIN — HEPARIN SODIUM 5000 UNITS: 5000 INJECTION INTRAVENOUS; SUBCUTANEOUS at 13:39

## 2024-04-16 RX ADMIN — ASPIRIN 81 MG 81 MG: 81 TABLET ORAL at 08:30

## 2024-04-16 RX ADMIN — SODIUM CHLORIDE: 9 INJECTION, SOLUTION INTRAVENOUS at 04:58

## 2024-04-16 RX ADMIN — SACUBITRIL AND VALSARTAN 1 TABLET: 24; 26 TABLET, FILM COATED ORAL at 20:06

## 2024-04-16 RX ADMIN — CLOPIDOGREL BISULFATE 75 MG: 75 TABLET ORAL at 08:30

## 2024-04-16 RX ADMIN — CARVEDILOL 3.12 MG: 3.12 TABLET, FILM COATED ORAL at 16:58

## 2024-04-16 RX ADMIN — CEFEPIME 1000 MG: 1 INJECTION, POWDER, FOR SOLUTION INTRAMUSCULAR; INTRAVENOUS at 05:00

## 2024-04-16 RX ADMIN — FERROUS SULFATE TAB 325 MG (65 MG ELEMENTAL FE) 325 MG: 325 (65 FE) TAB at 08:30

## 2024-04-16 RX ADMIN — HEPARIN SODIUM 5000 UNITS: 5000 INJECTION INTRAVENOUS; SUBCUTANEOUS at 22:16

## 2024-04-16 RX ADMIN — MIRTAZAPINE 15 MG: 15 TABLET, FILM COATED ORAL at 20:06

## 2024-04-16 ASSESSMENT — PAIN SCALES - GENERAL: PAINLEVEL_OUTOF10: 0

## 2024-04-16 NOTE — PROGRESS NOTES
Hospitalist Progress Note      Patient:  Shelli Herndon    Unit/Bed:5K-25/025-A  YOB: 1935  MRN: [id number]   Acct: [id number]   PCP: Gurmeet Reilly MD  Date of Admission: 4/14/2024    Assessment/Plan:    OM of the L second digit of the LLE  -Noted on x-ray.  -Follows with podiatry outpatient.  Noted to have MRSA and Enterobacter cloacae on recent culture on 3/28/2024.  -Podiatry following.  Planning for amputation of digit.  -Consult cardiology for peripheral artery intervention per podiatry recommendation.  -Staphylococcus growing. ID consulted to assist with abx guidance.  Continue with cefepime and vancomycin. Flagyl stopped.   Planning OR Thursday 4/18 for amputation of second digit    PAD  -Recent arterial Doppler with chronic occlusion of the right mid SFA, severely decreased bilateral ABIs, moderate stenosis of the left distal SFA  -Cardiology consult -underwent successful revascularization of totally occluded SFA and popliteal segment left side with angioplasty 4/15  -Continue DAPT  -Cardiology planning stage plasty in 2 to 3 weeks, will need follow-up as an outpatient    Non-insulin-dependent type 2 diabetes mellitus  -Hold oral meds. Carb controlled diet. Accuchecks. Low dose sliding scale.    Hyponatremia, resolved  -131.  Most recently 134 on 7/24/2023.  -Small bolus of normal saline given in the emergency department- resolved    Hyperkalemia  - resolved, continue to monitor with daily BMP  -Careful monitoring of resuming diuretic and Entresto    Lactic acidosis  -2.7.  Small bolus of IV fluids given.  -Recheck lactic resolved    HFrEF not in acute exacerbation  Echocardiogram not available.  Order placed to obtain records from [facility].  Diuretic regimen includes Bumex 1 mg 3 times weekly. Neph following  Daily weights.  Ins and outs.  Low-sodium diet.  Fluid restriction.    CKD stage III, unspecified stage  -Creatinine  BC No growth 24 hours. 04/14/2024 01:35 PM    BC No growth 24 hours. 04/14/2024 01:35 PM       Blood culture #2:No results found for: \"BLOODCULT2\"    Organism:  Lab Results   Component Value Date/Time    ORG Staphylococcus (coagulase positive) 04/14/2024 02:50 PM         Lab Results   Component Value Date/Time    LABGRAM  04/14/2024 02:50 PM     Rare segmented neutrophils observed. No epithelial cells observed. Moderate gram positive cocci occurring singly and in pairs.       MRSA culture only:No results found for: \"MRSAC\"    Urine culture:   Lab Results   Component Value Date/Time    LABURIN  04/17/2023 04:45 PM      Comment:      Urine culture        Respiratory culture: No results found for: \"CULTRESP\"    Aerobic and Anaerobic :  Lab Results   Component Value Date/Time    LABAERO moderate growth 04/14/2024 02:50 PM     No results found for: \"LABANAE\"    Urinalysis:      Lab Results   Component Value Date/Time    NITRU 2+ 03/08/2024 08:42 AM    WBCUA >100 03/08/2024 08:42 AM    BACTERIA 1+ 03/08/2024 08:42 AM    RBCUA 21-50 03/08/2024 08:42 AM    SPECGRAV 1.020 03/08/2024 08:42 AM    GLUCOSEU >1000 mg/dL 03/08/2024 08:42 AM       Radiology:  IR AORTAGRAM ABDOMINAL SERIALOGRAM   Final Result      US RENAL COMPLETE   Final Result   Negative for obstructive uropathy.   No suspicious renal mass.   Small volume debris noted in the bladder lumen suggesting complex fluid.      This document has been electronically signed by: Pat Harrison MD on    04/15/2024 06:55 AM      XR TOE LEFT (MIN 2 VIEWS)   Final Result   1. No fracture or dislocation.   2. Possible osseous erosion at the distal second toe suspicious for osteomyelitis.      Final report electronically signed by Dr. Phillip Jc on 4/14/2024 2:19 PM        IR AORTAGRAM ABDOMINAL SERIALOGRAM    Result Date: 4/15/2024  Radiology exam is complete. No Radiologist dictation. Please follow up with ordering provider.     US RENAL COMPLETE    Result Date:

## 2024-04-16 NOTE — PROGRESS NOTES
Teaching Note:    I was present with the resident during the visit. Discussed with the patient and call the daughter for plan of digital amputation 2nd left with tenotomy hallux left  on Thursday. I discussed the case with the resident and agree with the findings and the plan as documented in the residents note.    Simon Loredo DPM, FACFAS  Podiatric Medicine & Surgery       [facility]

## 2024-04-16 NOTE — PLAN OF CARE
Problem: ABCDS Injury Assessment  Goal: Absence of physical injury  Outcome: Progressing     Problem: Skin/Tissue Integrity  Goal: Absence of new skin breakdown  Description: 1.  Monitor for areas of redness and/or skin breakdown  2.  Assess vascular access sites hourly  3.  Every 4-6 hours minimum:  Change oxygen saturation probe site  4.  Every 4-6 hours:  If on nasal continuous positive airway pressure, respiratory therapy assess nares and determine need for appliance change or resting period.  Outcome: Progressing     Problem: Pain  Goal: Verbalizes/displays adequate comfort level or baseline comfort level  Outcome: Progressing     Problem: Safety - Adult  Goal: Free from fall injury  Outcome: Progressing     Problem: Discharge Planning  Goal: Discharge to home or other facility with appropriate resources  Outcome: Progressing     Problem: Skin/Tissue Integrity - Adult  Goal: Incisions, wounds, or drain sites healing without S/S of infection  Outcome: Progressing     Problem: Infection - Adult  Goal: Absence of infection during hospitalization  Outcome: Progressing     Problem: Chronic Conditions and Co-morbidities  Goal: Patient's chronic conditions and co-morbidity symptoms are monitored and maintained or improved  Outcome: Progressing     Problem: Nutrition Deficit:  Goal: Optimize nutritional status  Outcome: Progressing    Care plan reviewed with patient , patient verbalized understanding of plan of care and contributed to goal setting.

## 2024-04-16 NOTE — PLAN OF CARE
Problem: Pain  Goal: Verbalizes/displays adequate comfort level or baseline comfort level  Outcome: Progressing  Verbalizes/displays adequate comfort level or baseline comfort level:   Encourage patient to monitor pain and request assistance   Assess pain using appropriate pain scale   Administer analgesics based on type and severity of pain and evaluate response   Implement non-pharmacological measures as appropriate and evaluate response     Problem: Safety - Adult  Goal: Free from fall injury  Outcome: Progressing   Fall assessment completed. Patient using call light appropriately to call for assistance with ambulation to bathroom.  Personal items within reach. Patient is also compliant with use of non-skid slippers.  Placed bed in low position. Bed alarm turned on.     Problem: Skin/Tissue Integrity - Adult  Goal: Incisions, wounds, or drain sites healing without S/S of infection  Outcome: Progressing  Incisions, Wounds, or Drain Sites Healing Without Sign and Symptoms of Infection:   ADMISSION and DAILY: Assess and document risk factors for pressure ulcer development   TWICE DAILY: Assess and document skin integrity   TWICE DAILY: Assess and document dressing/incision, wound bed, drain sites and surrounding tissue   Implement wound care per orders     Problem: Infection - Adult  Goal: Absence of infection during hospitalization  Outcome: Progressing  Absence of infection during hospitalization:   Assess and monitor for signs and symptoms of infection   Monitor lab/diagnostic results   Monitor all insertion sites i.e., indwelling lines, tubes and drains   Administer medications as ordered   Instruct and encourage patient and family to use good hand hygiene technique     Problem: Discharge Planning  Goal: Discharge to home or other facility with appropriate resources  Outcome: Progressing  Discharge to home or other facility with appropriate resources: Identify barriers to discharge with patient and caregiver

## 2024-04-16 NOTE — PROGRESS NOTES
Cardiology Progress Note      Patient:  Shelli Herndon  YOB: 1935  MRN: [id number]   Acct: [id number]  Admit Date:  4/14/2024  Primary Cardiologist: LUCIUS  Seen by dr. Marte    Per prior cardiology consult note-  Reason for Consultation:  PAD, CLI, Need for peripheral angiogram        History Of Present Illness:    88 y.o. pleasant female with history of diabetes, CAD status post PCI, HFrEF, CKD, hypertension who presented to the hospital with left second digit wound.  She was evaluated by podiatry and imaging showed osteomyelitis.  Wound cultures were positive for MRSA and Enterobacter.  She underwent testing at [facility] which showed severely abnormal ABIs with 0.3 bilaterally.  According to family she was awaiting vascular consult which did not take place.  Cardiovascular team was consulted for possible peripheral angiogram and angioplasty to improve blood flow prior to possible amputation of the second digit on the left foot.  Patient does not ambulate much due to questionable CHF.      Subjective (Events in last 24 hours):   Pt up in chair   Farzana   Wants to go home     BL LE warm and pink - pulses present posterior TIB / pedal   Rt groin dressing dry and intact no ecchymosis or hematoma - pulses present neurovascular check WNL     Vss     Discussed with pt staged LE plasty in 2-3 weeks - she agrees to it       Objective:   BP (!) 154/74   Pulse 69   Temp 97.7 °F (36.5 °C) (Oral)   Resp 18   Ht 1.473 m (4' 10\")   Wt 58.8 kg (129 lb 11.2 oz)   SpO2 100%   BMI 27.11 kg/m²        TELEMETRY: NA    Physical Exam:  General Appearance: alert and oriented to person, place and time, in no acute distress  Cardiovascular: normal rate, regular rhythm, normal S1 and S2, no murmurs, rubs, clicks, or gallops, distal pulses intact,   Pulmonary/Chest: clear to auscultation bilaterally- no wheezes, rales or rhonchi, normal air movement, no respiratory distress  Abdomen: soft, non-tender,  WBC 5.8 04/16/2024 05:33 AM    RBC 4.07 04/16/2024 05:33 AM    RBC 4.60 04/05/2012 08:37 AM    HGB 13.0 04/16/2024 05:33 AM    HCT 41.7 04/16/2024 05:33 AM     04/16/2024 05:33 AM       CMP:    Lab Results   Component Value Date/Time     04/16/2024 05:33 AM    K 5.0 04/16/2024 05:33 AM    K 4.7 04/15/2024 07:14 AM     04/16/2024 05:33 AM    CO2 19 04/16/2024 05:33 AM    BUN 16 04/16/2024 05:33 AM    CREATININE 1.0 04/16/2024 05:33 AM    AGRATIO 1.2 07/24/2023 09:52 AM    LABGLOM 55 04/16/2024 05:33 AM    GLUCOSE 124 04/16/2024 05:33 AM    GLUCOSE 187 07/24/2023 09:52 AM    CALCIUM 8.0 04/16/2024 05:33 AM       Hepatic Function Panel:    Lab Results   Component Value Date/Time    ALKPHOS 145 04/14/2024 01:35 PM    ALT 12 04/14/2024 01:35 PM    AST 16 04/14/2024 01:35 PM    PROT 7.1 04/14/2024 01:35 PM    BILITOT 0.4 04/14/2024 01:35 PM    BILIDIR <0.2 04/14/2024 01:35 PM       Magnesium:    Lab Results   Component Value Date/Time    MG 1.9 09/24/2022 09:21 AM       PT/INR:    Lab Results   Component Value Date/Time    PROTIME 13.2 09/24/2022 09:21 AM    INR 1.14 09/24/2022 09:21 AM       HgBA1c:    Lab Results   Component Value Date/Time    LABA1C 6.4 04/06/2015 10:56 AM       FLP:    Lab Results   Component Value Date/Time    TRIG 278 07/24/2023 09:52 AM    HDL 44 07/24/2023 09:52 AM    LDLCALC 97 10/06/2014 07:16 AM    LDLDIRECT 107 07/24/2023 09:52 AM       TSH:    Lab Results   Component Value Date/Time    TSH 1.900 04/06/2015 10:56 AM         Assessment:    Severe PAD   LE angiogram 4/15/24:  Successful revascularization of totally occluded SFA and popliteal segment on the left side with IN.PACT Admiral drug-coated balloon angioplasty.   --Needs revascularization of the right SFA in 3 to 4 weeks.     Left second toe ulceration with OM plan for amputation    Wound Infection with MSSA,MRSA and enterobacter      Hx  CKD stage   HTN  HLP   DM   CAD - unknown details     Plan:  Ok for DC from

## 2024-04-16 NOTE — PROGRESS NOTES
Progress note: Infectious diseases    Patient - Shelli Herndon,  Age - 88 y.o.    - 1935      Room Number - 5K-25/025-A   MRN -  [id number]   [facility] # - [id number]  Date of Admission -  2024 12:45 PM    SUBJECTIVE:   No new issues.  OBJECTIVE   VITALS    height is 1.473 m (4' 10\") and weight is 58.1 kg (128 lb). Her oral temperature is 97.7 °F (36.5 °C). Her blood pressure is 154/74 (abnormal) and her pulse is 69. Her respiration is 18 and oxygen saturation is 100%.       Wt Readings from Last 3 Encounters:   24 58.1 kg (128 lb)   19 58.7 kg (129 lb 6.4 oz)   16 57.8 kg (127 lb 6.4 oz)       I/O (24 Hours)    Intake/Output Summary (Last 24 hours) at 2024 0955  Last data filed at 2024 0500  Gross per 24 hour   Intake 200 ml   Output 400 ml   Net -200 ml       General Appearance  Awake, alert, oriented,  not  In acute distress  HEENT - normocephalic, atraumatic, pink conjunctiva,  anicteric sclera  Neck - Supple, no mass  Lungs -  Bilateral good air entry, no rhonchi, no wheeze  Cardiovascular - Heart sounds are normal.     Abdomen - soft, not distended, nontender,   Neurologic -oriented  Skin - No bruising or bleeding  Extremities - left second toe ulceration. The left second toe is swollen and red    MEDICATIONS:      sodium chloride flush  5-40 mL IntraVENous 2 times per day    clopidogrel  75 mg Oral Daily    sodium chloride flush  5-40 mL IntraVENous 2 times per day    heparin (porcine)  5,000 Units SubCUTAneous 3 times per day    insulin lispro  0-4 Units SubCUTAneous TID WC    insulin lispro  0-4 Units SubCUTAneous Nightly    [Held by provider] sacubitril-valsartan  1 tablet Oral BID    carvedilol  3.125 mg Oral BID WC    aspirin  81 mg Oral Daily    isosorbide mononitrate  30 mg Oral Daily    atorvastatin  20 mg Oral Nightly    ferrous sulfate  325 mg Oral BID WC    [Held by

## 2024-04-16 NOTE — PROGRESS NOTES
Pharmacy Note - Extended Infusion Beta-Lactam Dose Adjustment    Cefepime 1000 mg q12h extended infusion for treatment of Bone and Joint Infection. Per [facility] Extended Infusion Beta-Lactam Policy, cefepime will be changed to   2000 mg q12h extended infusion     Estimated Creatinine Clearance: Estimated Creatinine Clearance: 33 mL/min (based on SCr of 1 mg/dL).    BMI: Body mass index is 27.11 kg/m².    Rationale for Adjustment: Dose adjusted per [facility] Extended Infusion Policy based on renal function and indication. The above medication is renally eliminated and demonstrates time-dependent effects on bacterial eradication. Extended-infusion dosing strategy aims to enhance microbiologic and clinical efficacy.     Pharmacy will monitor renal function daily and adjust dose as necessary.      Please call with any questions.    Thank you,    Jose Shepard, PharmD, BCPS  4/16/2024  3:51 PM

## 2024-04-16 NOTE — PROGRESS NOTES
Status:   Standing     Number of Occurrences:   1    Full Code     Standing Status:   Standing     Number of Occurrences:   1    Pharmacy to Dose: Vancomycin; Skin and Soft Tissue Infection, Bone and Joint Infection; 7 days     Standing Status:   Standing     Number of Occurrences:   1     Order Specific Question:   Pharmacy to Dose     Answer:   Vancomycin     Order Specific Question:   Antimicrobial Indications     Answer:   Skin and Soft Tissue Infection     Order Specific Question:   Antimicrobial Indications     Answer:   Bone and Joint Infection     Order Specific Question:   Skin duration of therapy     Answer:   7 days    Inpatient consult to Nephrology     Standing Status:   Standing     Number of Occurrences:   1     Order Specific Question:   Reason for Consult?     Answer:   CKD, likely will need IV contrast     Order Specific Question:   Provider Contacted?     Answer:   Yes    Inpatient consult to Cardiology     Standing Status:   Standing     Number of Occurrences:   1     Order Specific Question:   Reason for Consult?     Answer:   Peripheral vascular disease, needs amputation of left lower extremity second digit     Order Specific Question:   Provider Contacted?     Answer:   Yes    Inpatient consult to Spiritual Services     Standing Status:   Standing     Number of Occurrences:   1     Order Specific Question:   Reason for Consult?     Answer:   Other (comment)    Inpatient consult to Infectious Diseases     Standing Status:   Standing     Number of Occurrences:   1     Order Specific Question:   Reason for Consult?     Answer:   abx guidance with L toe OM (planning amputation)    Contact isolation     Standing Status:   Standing     Number of Occurrences:   1    ADULT ORAL NUTRITION SUPPLEMENT; Breakfast, Lunch; Wound Healing Oral Supplement     Standing Status:   Standing     Number of Occurrences:   1     Order Specific Question:   Frequency     Answer:   Breakfast     Order Specific Question:    Frequency     Answer:   Lunch     Order Specific Question:   Supplement     Answer:   Wound Healing Oral Supplement    Initiate Oxygen Therapy Protocol     Initiate oxygen therapy if the patient has 1) SpO2 is less than 90%, 2) Cyanosis, Chest Pain, Dyspnea, or Altered level of consciousness AND SpO2 checked and it is less than 90%, or 3) patient on Home oxygen.    To initiate oxygen therapy: nurse or RT enters Nasal cannula oxygen order using Per Protocol without Cosign order mode (if indication Home Oxygen then change L/min to same amount at home and change Wean to Room Air to No).    Notify provider if initiate oxygen therapy unless already on Home Oxygen.     Standing Status:   Standing     Number of Occurrences:   41502    Initiate Oxygen Therapy Protocol     Initiate oxygen therapy if the patient has 1) SpO2 is less than 90%, 2) Cyanosis, Chest Pain, Dyspnea, or Altered level of consciousness AND SpO2 checked and it is less than 90%, or 3) patient on Home oxygen.    To initiate oxygen therapy: nurse or RT enters Nasal cannula oxygen order using Per Protocol without Cosign order mode (if indication Home Oxygen then change L/min to same amount at home and change Wean to Room Air to No).    Notify provider if initiate oxygen therapy unless already on Home Oxygen.     Standing Status:   Standing     Number of Occurrences:   19151    POCT Glucose     Standing Status:   Standing     Number of Occurrences:   25    POCT Glucose     Repeat blood glucose 15 minutes following intervention.  If blood glucose is LESS THAN 70 mg/dL, repeat treatment and recheck blood glucose in 15 minutes x 2.  If blood glucose remains LESS THAN 70 mg/dL, call ordering provider for further instruction.   If patient experienced a hypoglycemic event in last 24 hours, obtain blood glucose at 0200.     Standing Status:   Standing     Number of Occurrences:   16036    POCT Glucose     Standing Status:   Standing     Number of Occurrences:   1

## 2024-04-16 NOTE — PROGRESS NOTES
Comprehensive Nutrition Assessment    Type and Reason for Visit: Initial (RD assessment d/t wound)    Nutrition Recommendations/Plan:   Continue diet as ordered.   Initiate Devante BID and continue at discharge.   Recommend MVI and continue at discharge.      Malnutrition Assessment:  Malnutrition Status: At risk for malnutrition (Comment)  Context: Acute Illness       Findings of the 6 clinical characteristics of malnutrition:  Energy Intake:  No significant decrease in energy intake  Weight Loss:  No significant weight loss     Body Fat Loss:  No significant body fat loss (age appropriate fat losses)     Muscle Mass Loss:  No significant muscle mass loss    Fluid Accumulation:  No significant fluid accumulation     Strength:  Not Performed    Nutrition Assessment:    Pt. nutritionally compromised AEB wounds. At risk for further nutrition compromise r/t increased nutrient needs for wound healing, patient admitted d/t left second digit foot wound- follows with podiatry outpatient. Plan for patient to go to OR with IR for possible toe amputation on 4/17. Noted underlying medical condition (PMHx CAD, T2DM, HLD, HTN).     Nutrition Related Findings:    Patient/ Family Comments: Per patient and daughter, her appetite has been good- normal recently. She has not had any weight changes- very stable. She denies any nausea/ vomiting/ diarrhea/ constipation. She usually eats 3 small meals and multiple snacks per day. RD educated patient and daughter on Devante to promote wound healing- plan to trial and likely continue at discharge. Daughter's  works at [employer] and would be able to easily pick some up from the cafeteria as it is cheaper to purchase at [facility] then online or at a pharmacy.   Edema: none,per flowsheet  GI Function: LBM 04/15/24 per flowsheet  Wound:  (gangrene to L toe- plan for amputation on 4/17)      Labs:   Lab Results   Component Value Date    LABA1C 6.4 04/06/2015    LABA1C 5.9 10/06/2014

## 2024-04-16 NOTE — PROGRESS NOTES
Daily    atorvastatin  20 mg Oral Nightly    ferrous sulfate  325 mg Oral BID WC    [Held by provider] bumetanide  1 mg Oral Q MWF    [Held by provider] potassium bicarb-citric acid  10 mEq Oral Q MWF    FLUoxetine  10 mg Oral Daily    mirtazapine  15 mg Oral Nightly    metroNIDAZOLE  500 mg IntraVENous Q8H    vancomycin (VANCOCIN) intermittent dosing (placeholder)   Other RX Placeholder    cefepime  1,000 mg IntraVENous Q12H       Lab Data :  CBC:   Recent Labs     04/14/24  1335 04/15/24  0714   WBC 7.3 5.3   HGB 14.0 13.1   HCT 43.9 40.7    227       CMP:  Recent Labs     04/14/24  1335 04/15/24  0714 04/16/24  0533   * 140 136   K 5.3* 4.7  4.7 5.0   CL 97* 108 107   CO2 22* 17* 19*   BUN 22 20 16   CREATININE 1.3* 1.2 1.0   GLUCOSE 273* 148* 124*   CALCIUM 9.0 8.2* 8.0*       Hepatic:   Recent Labs     04/14/24  1335   LABALBU 3.4*   AST 16   ALT 12   BILITOT 0.4   ALKPHOS 145*         Assessment and Plan:  Renal -patient appears to be having CKD stage III mostly due to senile nephrosclerosis and longstanding hypertension and diabetes.  She is also on multiple nephrotoxic agents.  She is having possible osteomyelitis and being planned for possible arteriogram.  Overall creatinine is better.  Bumex and Entresto was on hold  Status post arteriogram 4/15/2024 no signs of BEATRICE at this time  Discontinue IV fluids and monitor renal function  Electrolytes -mild hyponatremia improved with hydration  Mild hyperkalemia secondary to possible Entresto low potassium diet currently doing better  Essential hypertension running reasonable  Osteomyelitis of the left second toe seen by podiatry status post arteriogram with angioplasty 4/15/2024. For a Possible complications soon.  Mild acidosis follow for now  Meds reviewed and discussed with patient and family    Duc Dee MD  [facility]    This report has been created using voice recognition software. It may contain minor errors which

## 2024-04-16 NOTE — PROGRESS NOTES
[facility]   Pharmacy Pharmacokinetic Monitoring Service - Vancomycin    Indication: bone and joint infection   Target Concentration: Dosing based on anticipated concentration < 15 mg/L due to renal impairment/insufficiency  Day of Therapy: 2  Additional Antimicrobials: cefepime and metronidazole     Pertinent Laboratory Values:   Wt Readings from Last 1 Encounters:   04/14/24 58.1 kg (128 lb)     Temp Readings from Last 1 Encounters:   04/15/24 97.8 °F (36.6 °C) (Oral)     Estimated Creatinine Clearance: 27 mL/min (based on SCr of 1.2 mg/dL).  Recent Labs     04/14/24  1335 04/15/24  0714   CREATININE 1.3* 1.2   BUN 22 20   WBC 7.3 5.3     Pertinent Cultures:  Date Source Results   3/02/24 Left 2nd toe ulcer MRSA, enterobacter    4/14/24 Left 2nd toe ulcer Staphylococcus coagulase positive   4/14/24 Blood x 2 NGTD   MRSA Nasal Swab: N/A. Non-respiratory infection.    Recent vancomycin administrations                     vancomycin (VANCOCIN) 1250 mg in sodium chloride 0.9% 250 mL IVPB (mg) 1,250 mg New Bag 04/14/24 1949                    Assessment:  Date/Time Current Dose Concentration Timing of Concentration (h)   4/15/24 Intermittent, last dose 1250 mg 4/14 at 1949 10.8 22.5 hours post dose   Note: Serum concentrations collected for AUC dosing may appear elevated if collected in close proximity to the dose administered, this is not necessarily an indication of toxicity    Plan:  Give vancomycin 750 mg x 1 dose (~13 mg/kg)  Repeat vancomycin concentration ordered for 4/16/24 @ 2000   Pharmacy will continue to monitor patient and adjust therapy as indicated    Thank you for the consult,  Philomena Bravo, PharmD  PGY1 Resident

## 2024-04-16 NOTE — DISCHARGE INSTRUCTIONS
Follow back for stage Le angiogram with plasty 2-3 weeks - Dr. Marte       Groin Care Instructions        Normal Observation: You may or may not experience these.    Soreness or tenderness that may last a few weeks.    Possible bruising that could last a few weeks and up to one month.   Formation of a small lump (dime to quarter size) that should last only a few weeks.    Care of your incision  You may shower 24 hours after the procedure. Wet the dressing thoroughly and gently remove the bandage from the hospital during showering. It is easier to remove this way.             Gently clean your site daily using soap and water while standing in the shower. Dry thoroughly.   Do not apply powders or lotions to the site for 2 weeks.   Keep the site clean and dry to prevent infection.    Do not sit in a bathtub or a pool of water for 7 days.    Inspect the site daily.    Activity   You may resume normal activity in 2 days, including driving, letting pain be your     guide.   Limit lifting over 5 pounds (half gallon of milk) to one week or until site heals.  Limit vigorous activity (contact sports) to two weeks time.  You will be able to return to work in 1-3 days.    Call our office immediately if you experience any of the following…  Significant bleeding does not stop after 10 minutes of applying firm pressure directly over incision.   Increased swelling of groin or leg.   Unusual pain at groin or down that leg.   Signs of infection: redness, warmth to touch, drainage, poorly healing incision, fever, or chills.

## 2024-04-17 LAB
ANION GAP SERPL CALC-SCNC: 12 MEQ/L (ref 8–16)
BACTERIA SPEC AEROBE CULT: ABNORMAL
BACTERIA SPEC ANAEROBE CULT: ABNORMAL
BUN SERPL-MCNC: 14 MG/DL (ref 7–22)
CALCIUM SERPL-MCNC: 7.9 MG/DL (ref 8.5–10.5)
CHLORIDE SERPL-SCNC: 109 MEQ/L (ref 98–111)
CO2 SERPL-SCNC: 17 MEQ/L (ref 23–33)
CREAT SERPL-MCNC: 1 MG/DL (ref 0.4–1.2)
DEPRECATED RDW RBC AUTO: 49.3 FL (ref 35–45)
ERYTHROCYTE [DISTWIDTH] IN BLOOD BY AUTOMATED COUNT: 13.6 % (ref 11.5–14.5)
GFR SERPL CREATININE-BSD FRML MDRD: 55 ML/MIN/1.73M2
GLUCOSE BLD STRIP.AUTO-MCNC: 168 MG/DL (ref 70–108)
GLUCOSE BLD STRIP.AUTO-MCNC: 181 MG/DL (ref 70–108)
GLUCOSE BLD STRIP.AUTO-MCNC: 384 MG/DL (ref 70–108)
GLUCOSE SERPL-MCNC: 177 MG/DL (ref 70–108)
GRAM STN SPEC: ABNORMAL
HCT VFR BLD AUTO: 39.4 % (ref 37–47)
HGB BLD-MCNC: 12.3 GM/DL (ref 12–16)
MCH RBC QN AUTO: 31.5 PG (ref 26–33)
MCHC RBC AUTO-ENTMCNC: 31.2 GM/DL (ref 32.2–35.5)
MCV RBC AUTO: 101 FL (ref 81–99)
ORGANISM: ABNORMAL
ORGANISM: ABNORMAL
PLATELET # BLD AUTO: 204 THOU/MM3 (ref 130–400)
PMV BLD AUTO: 9.5 FL (ref 9.4–12.4)
POTASSIUM SERPL-SCNC: 4.4 MEQ/L (ref 3.5–5.2)
RBC # BLD AUTO: 3.9 MILL/MM3 (ref 4.2–5.4)
SODIUM SERPL-SCNC: 138 MEQ/L (ref 135–145)
VANCOMYCIN SERPL-MCNC: 13.2 UG/ML (ref 0.1–39.9)
WBC # BLD AUTO: 5.1 THOU/MM3 (ref 4.8–10.8)

## 2024-04-17 PROCEDURE — 1200000003 HC TELEMETRY R&B

## 2024-04-17 PROCEDURE — 82948 REAGENT STRIP/BLOOD GLUCOSE: CPT

## 2024-04-17 PROCEDURE — 2580000003 HC RX 258: Performed by: PHYSICIAN ASSISTANT

## 2024-04-17 PROCEDURE — 6370000000 HC RX 637 (ALT 250 FOR IP): Performed by: INTERNAL MEDICINE

## 2024-04-17 PROCEDURE — 85027 COMPLETE CBC AUTOMATED: CPT

## 2024-04-17 PROCEDURE — 6370000000 HC RX 637 (ALT 250 FOR IP)

## 2024-04-17 PROCEDURE — 99232 SBSQ HOSP IP/OBS MODERATE 35: CPT

## 2024-04-17 PROCEDURE — 2580000003 HC RX 258: Performed by: INTERNAL MEDICINE

## 2024-04-17 PROCEDURE — 6370000000 HC RX 637 (ALT 250 FOR IP): Performed by: NURSE PRACTITIONER

## 2024-04-17 PROCEDURE — 6370000000 HC RX 637 (ALT 250 FOR IP): Performed by: PHYSICIAN ASSISTANT

## 2024-04-17 PROCEDURE — 99232 SBSQ HOSP IP/OBS MODERATE 35: CPT | Performed by: INTERNAL MEDICINE

## 2024-04-17 PROCEDURE — 6360000002 HC RX W HCPCS: Performed by: PHYSICIAN ASSISTANT

## 2024-04-17 PROCEDURE — 80048 BASIC METABOLIC PNL TOTAL CA: CPT

## 2024-04-17 PROCEDURE — 36415 COLL VENOUS BLD VENIPUNCTURE: CPT

## 2024-04-17 RX ORDER — INSULIN LISPRO 100 [IU]/ML
4 INJECTION, SOLUTION INTRAVENOUS; SUBCUTANEOUS ONCE
Status: COMPLETED | OUTPATIENT
Start: 2024-04-17 | End: 2024-04-17

## 2024-04-17 RX ORDER — INSULIN GLARGINE 100 [IU]/ML
0.15 INJECTION, SOLUTION SUBCUTANEOUS NIGHTLY
Status: DISCONTINUED | OUTPATIENT
Start: 2024-04-17 | End: 2024-04-20

## 2024-04-17 RX ADMIN — CEFEPIME 2000 MG: 2 INJECTION, POWDER, FOR SOLUTION INTRAVENOUS at 15:57

## 2024-04-17 RX ADMIN — VANCOMYCIN HYDROCHLORIDE 750 MG: 5 INJECTION, POWDER, LYOPHILIZED, FOR SOLUTION INTRAVENOUS at 09:29

## 2024-04-17 RX ADMIN — MIRTAZAPINE 15 MG: 15 TABLET, FILM COATED ORAL at 20:00

## 2024-04-17 RX ADMIN — CARVEDILOL 3.12 MG: 3.12 TABLET, FILM COATED ORAL at 08:23

## 2024-04-17 RX ADMIN — SACUBITRIL AND VALSARTAN 1 TABLET: 24; 26 TABLET, FILM COATED ORAL at 08:22

## 2024-04-17 RX ADMIN — ISOSORBIDE MONONITRATE 30 MG: 30 TABLET, EXTENDED RELEASE ORAL at 08:23

## 2024-04-17 RX ADMIN — INSULIN LISPRO 4 UNITS: 100 INJECTION, SOLUTION INTRAVENOUS; SUBCUTANEOUS at 15:41

## 2024-04-17 RX ADMIN — HEPARIN SODIUM 5000 UNITS: 5000 INJECTION INTRAVENOUS; SUBCUTANEOUS at 05:50

## 2024-04-17 RX ADMIN — INSULIN GLARGINE 9 UNITS: 100 INJECTION, SOLUTION SUBCUTANEOUS at 21:47

## 2024-04-17 RX ADMIN — CARVEDILOL 3.12 MG: 3.12 TABLET, FILM COATED ORAL at 15:40

## 2024-04-17 RX ADMIN — FERROUS SULFATE TAB 325 MG (65 MG ELEMENTAL FE) 325 MG: 325 (65 FE) TAB at 08:23

## 2024-04-17 RX ADMIN — ASPIRIN 81 MG 81 MG: 81 TABLET ORAL at 08:22

## 2024-04-17 RX ADMIN — CEFEPIME 2000 MG: 2 INJECTION, POWDER, FOR SOLUTION INTRAVENOUS at 05:50

## 2024-04-17 RX ADMIN — CLOPIDOGREL BISULFATE 75 MG: 75 TABLET ORAL at 08:22

## 2024-04-17 RX ADMIN — FERROUS SULFATE TAB 325 MG (65 MG ELEMENTAL FE) 325 MG: 325 (65 FE) TAB at 15:40

## 2024-04-17 RX ADMIN — METRONIDAZOLE 500 MG: 500 INJECTION, SOLUTION INTRAVENOUS at 02:34

## 2024-04-17 RX ADMIN — SODIUM CHLORIDE, PRESERVATIVE FREE 10 ML: 5 INJECTION INTRAVENOUS at 20:00

## 2024-04-17 RX ADMIN — SODIUM CHLORIDE, PRESERVATIVE FREE 10 ML: 5 INJECTION INTRAVENOUS at 08:27

## 2024-04-17 RX ADMIN — ROSUVASTATIN CALCIUM 20 MG: 20 TABLET, FILM COATED ORAL at 20:00

## 2024-04-17 RX ADMIN — INSULIN LISPRO 8 UNITS: 100 INJECTION, SOLUTION INTRAVENOUS; SUBCUTANEOUS at 15:41

## 2024-04-17 RX ADMIN — HEPARIN SODIUM 5000 UNITS: 5000 INJECTION INTRAVENOUS; SUBCUTANEOUS at 21:47

## 2024-04-17 RX ADMIN — FLUOXETINE 10 MG: 10 CAPSULE ORAL at 08:23

## 2024-04-17 NOTE — PROGRESS NOTES
DIET; Regular  ADULT ORAL NUTRITION SUPPLEMENT; Breakfast, Lunch; Wound Healing Oral Supplement  ROS: Pertinent positives as noted in HPI. All other systems reviewed and negative.    Past medical history, family history, social history and allergies reviewed again and is unchanged since admission.    Exam:  BP (!) 150/69   Pulse 66   Temp 98.2 °F (36.8 °C) (Oral)   Resp 18   Ht 1.473 m (4' 10\")   Wt 58.8 kg (129 lb 11.2 oz)   SpO2 100%   BMI 27.11 kg/m²   General:   Pleasant female resting in chair, no apparent distress  HEENT:  normocephalic and atraumatic.  No scleral icterus. PERR.  Neck: supple.  Trachea midline  Lungs: clear to auscultation, no wheezes/rhonchi/rales.  No retractions  Cardiac: RRR without murmur.  Abdomen: soft.  Nontender.  Bowel sounds positive.  Extremities:  No clubbing, cyanosis.  Left lower extremity with clean/dry/intact dressing  Vasculature: capillary refill < 3 seconds. Palpable LE pulses bilaterally.  Skin:  warm and dry.  Psych:  Alert and oriented x3.  Affect appropriate  Neurologic:  No focal deficit.  No seizures.      Data: (All radiographs, tracings, PFTs, and imaging are personally viewed and interpreted unless otherwise noted)  Labs:   Recent Labs     04/15/24  0714 04/16/24  0533 04/17/24  0517   WBC 5.3 5.8 5.1   HGB 13.1 13.0 12.3   HCT 40.7 41.7 39.4    212 204     Recent Labs     04/15/24  0714 04/16/24  0533 04/17/24  0517    136 138   K 4.7  4.7 5.0 4.4    107 109   CO2 17* 19* 17*   BUN 20 16 14   CREATININE 1.2 1.0 1.0   CALCIUM 8.2* 8.0* 7.9*     Recent Labs     04/14/24  1335   AST 16   ALT 12   BILIDIR <0.2   BILITOT 0.4   ALKPHOS 145*     No results for input(s): \"INR\" in the last 72 hours.  No results for input(s): \"CKTOTAL\", \"TROPONINI\" in the last 72 hours.  Urinalysis:   Lab Results   Component Value Date/Time    NITRU 2+ 03/08/2024 08:42 AM    WBCUA >100 03/08/2024 08:42 AM    BACTERIA 1+ 03/08/2024 08:42 AM    RBCUA 21-50  03/08/2024 08:42 AM    SPECGRAV 1.020 03/08/2024 08:42 AM    GLUCOSEU >1000 mg/dL 03/08/2024 08:42 AM     Urine culture:   Lab Results   Component Value Date/Time    LABURIN  04/17/2023 04:45 PM      Comment:      Urine culture      Micro:   Blood culture #1:   Lab Results   Component Value Date/Time    BC No growth 24 hours. No growth 48 hours. 04/14/2024 01:35 PM    BC No growth 24 hours. No growth 48 hours. 04/14/2024 01:35 PM     Blood culture #2:No results found for: \"BLOODCULT2\"  Organism:  Lab Results   Component Value Date/Time    ORG Staphylococcus aureus 04/14/2024 02:50 PM    ORG Streptococcus agalactiae - (Group B) 04/14/2024 02:50 PM         Lab Results   Component Value Date/Time    LABGRAM  04/14/2024 02:50 PM     Rare segmented neutrophils observed. No epithelial cells observed. Moderate gram positive cocci occurring singly and in pairs.     MRSA culture only:No results found for: \"MRSAC\"  Respiratory culture: No results found for: \"CULTRESP\"  Aerobic and Anaerobic :  Lab Results   Component Value Date/Time    LABAERO  04/14/2024 02:50 PM     Culture also yielded moderate growth of Staphylococcus species (coagualse negative). At least one of drugs in current antibiotic therapy is ineffective in vitro for isolates.    LABAERO  04/14/2024 02:50 PM     moderate growth In the treatment of gram positive infections, GENTAMICIN should be CONSIDERED a SYNERGYSTIC agent ONLY. Ciprofloxacin and Levofloxacin, regardless of in vitro sensitivity, should not be used for staphylococcal infections other than uncomplicated lower UTIs. Moxifloxacin, regardless of in vitro sensitivity, should not be used for staphylococcal infections.       LABAERO  04/14/2024 02:50 PM     moderate growth Group B streptococci are susceptible to ampicillin, penicillin and cefazolin, but may be erythromycin and/or clindamycin resistant. Contact microbiology if erythromycin and/or clindamycin testing is necessary.     No results found

## 2024-04-17 NOTE — PROGRESS NOTES
[facility]   Nephrology progress note  4/17/2024, 10:10 AM      Pt Name:    Shelli Herndon  MRN:     [id number]     YOB: 1935  Admit Date:    4/14/2024 12:45 PM    Chief Complaint: Nephrology following for CKD/clearance for arteriogram.    Subjective:  Patient seen and examined  No chest pain or shortness of breath  No other complaints    Objective:  24HR INTAKE/OUTPUT:    Intake/Output Summary (Last 24 hours) at 4/17/2024 1010  Last data filed at 4/17/2024 0621  Gross per 24 hour   Intake 1310 ml   Output --   Net 1310 ml        Admission weight: 58.1 kg (128 lb)  Wt Readings from Last 3 Encounters:   04/16/24 58.8 kg (129 lb 11.2 oz)   01/17/19 58.7 kg (129 lb 6.4 oz)   08/30/16 57.8 kg (127 lb 6.4 oz)        Vitals :   Vitals:    04/16/24 2359 04/17/24 0030 04/17/24 0400 04/17/24 0815   BP: (!) 188/88 (!) 180/80 (!) 177/76 (!) 150/69   Pulse: 69  77 66   Resp: 18  19 18   Temp: 98.2 °F (36.8 °C)  97.9 °F (36.6 °C) 98.2 °F (36.8 °C)   TempSrc: Oral  Oral Oral   SpO2: 100%  97% 100%   Weight:       Height:           Physical examination  General Appearance:  Well developed. No distress  Mouth/Throat:  Oral mucosa moist  Neck:  Supple, no JVD  Lungs:  Breath sounds: clear  Heart::  S1,S2 heard  Abdomen:  Soft, non - tender  Musculoskeletal: Wound noted on the left foot    Medications:  Infusion:    sodium chloride      sodium chloride 20 mL/hr at 04/16/24 1338    dextrose       Meds:    vancomycin  750 mg IntraVENous Q24H    rosuvastatin  20 mg Oral Nightly    insulin lispro  0-8 Units SubCUTAneous TID WC    insulin lispro  0-4 Units SubCUTAneous Nightly    cefepime  2,000 mg IntraVENous Q12H    sodium chloride flush  5-40 mL IntraVENous 2 times per day    clopidogrel  75 mg Oral Daily    sodium chloride flush  5-40 mL IntraVENous 2 times per day    heparin (porcine)  5,000 Units SubCUTAneous 3 times per day    sacubitril-valsartan  1 tablet Oral BID    carvedilol  3.125 mg Oral BID

## 2024-04-17 NOTE — PLAN OF CARE
Problem: ABCDS Injury Assessment  Goal: Absence of physical injury  4/16/2024 2105 by Reina Ashby RN  Outcome: Progressing     Problem: Skin/Tissue Integrity  Goal: Absence of new skin breakdown  Description: 1.  Monitor for areas of redness and/or skin breakdown  2.  Assess vascular access sites hourly  3.  Every 4-6 hours minimum:  Change oxygen saturation probe site  4.  Every 4-6 hours:  If on nasal continuous positive airway pressure, respiratory therapy assess nares and determine need for appliance change or resting period.  4/16/2024 2105 by Reina Ashby RN  Outcome: Progressing     Problem: Pain  Goal: Verbalizes/displays adequate comfort level or baseline comfort level  4/16/2024 2105 by Reina Ashby RN  Outcome: Progressing   Pain Assessment: None - Denies Pain  Pain Level: 0   Patient's Stated Pain Goal: 0 - No pain   Is pain goal met at this time?  Yes          Problem: Safety - Adult  Goal: Free from fall injury  4/16/2024 2105 by Reina Ashby RN  Outcome: Progressing   All fall precautions in place. Bed in low position, alarm activated and appropriate use of call light.     Problem: Discharge Planning  Goal: Discharge to home or other facility with appropriate resources  4/16/2024 2105 by Reina Ashby RN  Outcome: Progressing     Problem: Skin/Tissue Integrity - Adult  Goal: Incisions, wounds, or drain sites healing without S/S of infection  4/16/2024 2105 by Reina Ashby RN  Outcome: Progressing   Skin assessment completed.  Patient turned every 2 hours and as needed.  No skin breakdown this shift.      Problem: Infection - Adult  Goal: Absence of infection during hospitalization  4/16/2024 2105 by Reina Ashby RN  Outcome: Progressing     Problem: Chronic Conditions and Co-morbidities  Goal: Patient's chronic conditions and co-morbidity symptoms are monitored and maintained or improved  4/16/2024 2105 by Reina Ashby RN  Outcome: Progressing     Problem: Nutrition

## 2024-04-17 NOTE — PROGRESS NOTES
Progress note: Infectious diseases    Patient - Shelli Herndon,  Age - 88 y.o.    - 1935      Room Number - 5K-25/025-A   MRN -  [id number]   [facility] # - [id number]  Date of Admission -  2024 12:45 PM    SUBJECTIVE:   No new issues. Afebrile HTN overnight 1150/69 rest of vitals WNL. Currently planning on amputation by podiatry  for. Pt continues on maxipime  - 200 urine out yesterday.     Hx: 88-yo w/ DM2, CAD, CKD3, MI, HLD, HTN CC: macerated and reddened left second toe  x 1-2 days. Doppler showed poor circulation. Angiogram 4/15 showed this as well. Performed arthroplasty at same time. Culture positive for S. Aureus and GBS. Bcx2 NGTD.    OBJECTIVE   VITALS    height is 1.473 m (4' 10\") and weight is 58.8 kg (129 lb 11.2 oz). Her oral temperature is 98.2 °F (36.8 °C). Her blood pressure is 150/69 (abnormal) and her pulse is 66. Her respiration is 18 and oxygen saturation is 100%.       Wt Readings from Last 3 Encounters:   24 58.8 kg (129 lb 11.2 oz)   19 58.7 kg (129 lb 6.4 oz)   16 57.8 kg (127 lb 6.4 oz)       I/O (24 Hours)    Intake/Output Summary (Last 24 hours) at 2024 1217  Last data filed at 2024 0621  Gross per 24 hour   Intake 1310 ml   Output --   Net 1310 ml         General Appearance  WD/WN,older female, sitting in bedside chair. Awake, alert, oriented, in no acute distress  HEENT - normocephalic, atraumatic, pink conjunctiva,  anicteric sclera  Neck - Supple, no mass  Lungs -  Bilateral good air entry, no rhonchi, no wheeze  Cardiovascular - Heart sounds are normal.     Abdomen - soft, not distended, nontender,   Neurologic -oriented  Skin - No bruising or bleeding  Extremities - left second toe ulceration. The left second toe is less swollen and red    MEDICATIONS:      vancomycin  750 mg IntraVENous Q24H    rosuvastatin  20 mg Oral Nightly    insulin lispro  0-8

## 2024-04-17 NOTE — PROGRESS NOTES
[facility]   Pharmacy Pharmacokinetic Monitoring Service - Vancomycin    Indication: SSTI, bone/joint infection  Target Concentration: Dosing based on anticipated concentration < 15 mg/L due to renal impairment/insufficiency  Day of Therapy: 4  Additional Antimicrobials: cefepime    Pertinent Laboratory Values:   Wt Readings from Last 1 Encounters:   04/16/24 58.8 kg (129 lb 11.2 oz)     Temp Readings from Last 1 Encounters:   04/16/24 98.2 °F (36.8 °C) (Oral)     Estimated Creatinine Clearance: 33 mL/min (based on SCr of 1 mg/dL).  Recent Labs     04/15/24  0714 04/16/24  0533   CREATININE 1.2 1.0   BUN 20 16   WBC 5.3 5.8       Current Regimen: Intermittent  Date Time Vancomycin Dose Random Level   4/14/24 1949 1250 mg     4/15/24 1814   10.8 mcg/mL   4/15/24 2113 750 mg     4/16/24  1935    13.2 mcg/ml                                        Recent vancomycin administrations                     vancomycin (VANCOCIN) 750 mg in sodium chloride 0.9 % 250 mL IVPB (mg) 750 mg New Bag 04/15/24 2113    vancomycin (VANCOCIN) 1250 mg in sodium chloride 0.9% 250 mL IVPB (mg) 1,250 mg New Bag 04/14/24 1949                    Note: Serum concentrations collected for AUC dosing may appear elevated if collected in close proximity to the dose administered, this is not necessarily an indication of toxicity    Plan:  Current dosing regimen is  appropriate  Will start vancomycin 750mg q24h today  Repeat vancomycin concentration ordered for 4/19 @ 0600   Pharmacy will continue to monitor patient and adjust therapy as indicated    Thank you for the consult,  Sonia Adam RPh, BCPS, BCGP  4/17/2024     4:44 AM

## 2024-04-18 ENCOUNTER — ANESTHESIA EVENT (OUTPATIENT)
Dept: OPERATING ROOM | Age: 89
End: 2024-04-18
Payer: MEDICARE

## 2024-04-18 ENCOUNTER — ANESTHESIA (OUTPATIENT)
Dept: OPERATING ROOM | Age: 89
End: 2024-04-18
Payer: MEDICARE

## 2024-04-18 LAB
ANION GAP SERPL CALC-SCNC: 11 MEQ/L (ref 8–16)
BUN SERPL-MCNC: 14 MG/DL (ref 7–22)
CALCIUM SERPL-MCNC: 8.2 MG/DL (ref 8.5–10.5)
CHLORIDE SERPL-SCNC: 112 MEQ/L (ref 98–111)
CO2 SERPL-SCNC: 16 MEQ/L (ref 23–33)
CREAT SERPL-MCNC: 1 MG/DL (ref 0.4–1.2)
DEPRECATED RDW RBC AUTO: 49.8 FL (ref 35–45)
ERYTHROCYTE [DISTWIDTH] IN BLOOD BY AUTOMATED COUNT: 13.8 % (ref 11.5–14.5)
GFR SERPL CREATININE-BSD FRML MDRD: 55 ML/MIN/1.73M2
GLUCOSE BLD STRIP.AUTO-MCNC: 134 MG/DL (ref 70–108)
GLUCOSE BLD STRIP.AUTO-MCNC: 139 MG/DL (ref 70–108)
GLUCOSE BLD STRIP.AUTO-MCNC: 143 MG/DL (ref 70–108)
GLUCOSE BLD STRIP.AUTO-MCNC: 200 MG/DL (ref 70–108)
GLUCOSE SERPL-MCNC: 169 MG/DL (ref 70–108)
HCT VFR BLD AUTO: 39 % (ref 37–47)
HGB BLD-MCNC: 12.3 GM/DL (ref 12–16)
MCH RBC QN AUTO: 31.5 PG (ref 26–33)
MCHC RBC AUTO-ENTMCNC: 31.5 GM/DL (ref 32.2–35.5)
MCV RBC AUTO: 99.7 FL (ref 81–99)
PLATELET # BLD AUTO: 205 THOU/MM3 (ref 130–400)
PMV BLD AUTO: 9.7 FL (ref 9.4–12.4)
POTASSIUM SERPL-SCNC: 4.3 MEQ/L (ref 3.5–5.2)
RBC # BLD AUTO: 3.91 MILL/MM3 (ref 4.2–5.4)
SODIUM SERPL-SCNC: 139 MEQ/L (ref 135–145)
WBC # BLD AUTO: 5.1 THOU/MM3 (ref 4.8–10.8)

## 2024-04-18 PROCEDURE — 2709999900 HC NON-CHARGEABLE SUPPLY: Performed by: PODIATRIST

## 2024-04-18 PROCEDURE — 6370000000 HC RX 637 (ALT 250 FOR IP): Performed by: PODIATRIST

## 2024-04-18 PROCEDURE — 6360000002 HC RX W HCPCS: Performed by: PODIATRIST

## 2024-04-18 PROCEDURE — 99232 SBSQ HOSP IP/OBS MODERATE 35: CPT | Performed by: INTERNAL MEDICINE

## 2024-04-18 PROCEDURE — 3700000001 HC ADD 15 MINUTES (ANESTHESIA): Performed by: PODIATRIST

## 2024-04-18 PROCEDURE — 6360000002 HC RX W HCPCS

## 2024-04-18 PROCEDURE — 3700000000 HC ANESTHESIA ATTENDED CARE: Performed by: PODIATRIST

## 2024-04-18 PROCEDURE — 82948 REAGENT STRIP/BLOOD GLUCOSE: CPT

## 2024-04-18 PROCEDURE — B41G1ZZ FLUOROSCOPY OF LEFT LOWER EXTREMITY ARTERIES USING LOW OSMOLAR CONTRAST: ICD-10-PCS | Performed by: PODIATRIST

## 2024-04-18 PROCEDURE — 6370000000 HC RX 637 (ALT 250 FOR IP): Performed by: PHYSICIAN ASSISTANT

## 2024-04-18 PROCEDURE — 047L3Z1 DILATION OF LEFT FEMORAL ARTERY USING DRUG-COATED BALLOON, PERCUTANEOUS APPROACH: ICD-10-PCS | Performed by: PODIATRIST

## 2024-04-18 PROCEDURE — 2580000003 HC RX 258: Performed by: PODIATRIST

## 2024-04-18 PROCEDURE — 36415 COLL VENOUS BLD VENIPUNCTURE: CPT

## 2024-04-18 PROCEDURE — 3600000002 HC SURGERY LEVEL 2 BASE: Performed by: PODIATRIST

## 2024-04-18 PROCEDURE — 88305 TISSUE EXAM BY PATHOLOGIST: CPT

## 2024-04-18 PROCEDURE — 2580000003 HC RX 258: Performed by: PHYSICIAN ASSISTANT

## 2024-04-18 PROCEDURE — B4101ZZ FLUOROSCOPY OF ABDOMINAL AORTA USING LOW OSMOLAR CONTRAST: ICD-10-PCS | Performed by: PODIATRIST

## 2024-04-18 PROCEDURE — 80048 BASIC METABOLIC PNL TOTAL CA: CPT

## 2024-04-18 PROCEDURE — 99232 SBSQ HOSP IP/OBS MODERATE 35: CPT

## 2024-04-18 PROCEDURE — 1200000003 HC TELEMETRY R&B

## 2024-04-18 PROCEDURE — 6360000002 HC RX W HCPCS: Performed by: PHYSICIAN ASSISTANT

## 2024-04-18 PROCEDURE — 6360000002 HC RX W HCPCS: Performed by: NURSE ANESTHETIST, CERTIFIED REGISTERED

## 2024-04-18 PROCEDURE — 3600000012 HC SURGERY LEVEL 2 ADDTL 15MIN: Performed by: PODIATRIST

## 2024-04-18 PROCEDURE — 0Y6S0Z0 DETACHMENT AT LEFT 2ND TOE, COMPLETE, OPEN APPROACH: ICD-10-PCS | Performed by: PODIATRIST

## 2024-04-18 PROCEDURE — 88311 DECALCIFY TISSUE: CPT

## 2024-04-18 PROCEDURE — 85027 COMPLETE CBC AUTOMATED: CPT

## 2024-04-18 PROCEDURE — B41F1ZZ FLUOROSCOPY OF RIGHT LOWER EXTREMITY ARTERIES USING LOW OSMOLAR CONTRAST: ICD-10-PCS | Performed by: PODIATRIST

## 2024-04-18 PROCEDURE — 2500000003 HC RX 250 WO HCPCS: Performed by: NURSE ANESTHETIST, CERTIFIED REGISTERED

## 2024-04-18 RX ORDER — SODIUM CHLORIDE 9 MG/ML
INJECTION, SOLUTION INTRAVENOUS PRN
Status: DISCONTINUED | OUTPATIENT
Start: 2024-04-18 | End: 2024-04-20 | Stop reason: HOSPADM

## 2024-04-18 RX ORDER — SODIUM CHLORIDE 0.9 % (FLUSH) 0.9 %
5-40 SYRINGE (ML) INJECTION EVERY 12 HOURS SCHEDULED
Status: DISCONTINUED | OUTPATIENT
Start: 2024-04-18 | End: 2024-04-20 | Stop reason: HOSPADM

## 2024-04-18 RX ORDER — OXYCODONE HYDROCHLORIDE 5 MG/1
5 TABLET ORAL EVERY 4 HOURS PRN
Status: DISCONTINUED | OUTPATIENT
Start: 2024-04-18 | End: 2024-04-20 | Stop reason: HOSPADM

## 2024-04-18 RX ORDER — POTASSIUM CHLORIDE 20 MEQ/1
10 TABLET, EXTENDED RELEASE ORAL
Status: DISCONTINUED | OUTPATIENT
Start: 2024-04-19 | End: 2024-04-18

## 2024-04-18 RX ORDER — CARVEDILOL 3.12 MG/1
3.12 TABLET ORAL 2 TIMES DAILY WITH MEALS
Status: DISCONTINUED | OUTPATIENT
Start: 2024-04-18 | End: 2024-04-18

## 2024-04-18 RX ORDER — ALOGLIPTIN 6.25 MG/1
6.25 TABLET, FILM COATED ORAL DAILY
Status: DISCONTINUED | OUTPATIENT
Start: 2024-04-18 | End: 2024-04-18

## 2024-04-18 RX ORDER — LANOLIN ALCOHOL/MO/W.PET/CERES
325 CREAM (GRAM) TOPICAL
Status: DISCONTINUED | OUTPATIENT
Start: 2024-04-18 | End: 2024-04-18 | Stop reason: SDUPTHER

## 2024-04-18 RX ORDER — PIOGLITAZONEHYDROCHLORIDE 15 MG/1
15 TABLET ORAL DAILY
Status: DISCONTINUED | OUTPATIENT
Start: 2024-04-18 | End: 2024-04-18

## 2024-04-18 RX ORDER — GLIPIZIDE 5 MG/1
5 TABLET ORAL
Status: DISCONTINUED | OUTPATIENT
Start: 2024-04-18 | End: 2024-04-18

## 2024-04-18 RX ORDER — MIRTAZAPINE 15 MG/1
15 TABLET, FILM COATED ORAL ONCE
Status: DISCONTINUED | OUTPATIENT
Start: 2024-04-18 | End: 2024-04-18 | Stop reason: SDUPTHER

## 2024-04-18 RX ORDER — MORPHINE SULFATE 2 MG/ML
2 INJECTION, SOLUTION INTRAMUSCULAR; INTRAVENOUS
Status: DISCONTINUED | OUTPATIENT
Start: 2024-04-18 | End: 2024-04-20 | Stop reason: HOSPADM

## 2024-04-18 RX ORDER — LIDOCAINE HYDROCHLORIDE 20 MG/ML
INJECTION, SOLUTION EPIDURAL; INFILTRATION; INTRACAUDAL; PERINEURAL PRN
Status: DISCONTINUED | OUTPATIENT
Start: 2024-04-18 | End: 2024-04-18 | Stop reason: SDUPTHER

## 2024-04-18 RX ORDER — BUPIVACAINE HYDROCHLORIDE 5 MG/ML
INJECTION, SOLUTION EPIDURAL; INTRACAUDAL PRN
Status: DISCONTINUED | OUTPATIENT
Start: 2024-04-18 | End: 2024-04-18 | Stop reason: ALTCHOICE

## 2024-04-18 RX ORDER — BUMETANIDE 1 MG/1
1 TABLET ORAL
Status: DISCONTINUED | OUTPATIENT
Start: 2024-04-19 | End: 2024-04-18

## 2024-04-18 RX ORDER — LISINOPRIL 20 MG/1
20 TABLET ORAL 2 TIMES DAILY
Status: DISCONTINUED | OUTPATIENT
Start: 2024-04-18 | End: 2024-04-18 | Stop reason: ALTCHOICE

## 2024-04-18 RX ORDER — SODIUM CHLORIDE 0.9 % (FLUSH) 0.9 %
5-40 SYRINGE (ML) INJECTION PRN
Status: DISCONTINUED | OUTPATIENT
Start: 2024-04-18 | End: 2024-04-20 | Stop reason: HOSPADM

## 2024-04-18 RX ORDER — HYDRALAZINE HYDROCHLORIDE 20 MG/ML
5 INJECTION INTRAMUSCULAR; INTRAVENOUS ONCE
Status: COMPLETED | OUTPATIENT
Start: 2024-04-18 | End: 2024-04-18

## 2024-04-18 RX ORDER — FLUOXETINE 10 MG/1
10 TABLET, FILM COATED ORAL DAILY
Status: DISCONTINUED | OUTPATIENT
Start: 2024-04-18 | End: 2024-04-18 | Stop reason: SDUPTHER

## 2024-04-18 RX ORDER — MORPHINE SULFATE 4 MG/ML
4 INJECTION, SOLUTION INTRAMUSCULAR; INTRAVENOUS
Status: DISCONTINUED | OUTPATIENT
Start: 2024-04-18 | End: 2024-04-20 | Stop reason: HOSPADM

## 2024-04-18 RX ORDER — OXYCODONE HYDROCHLORIDE 5 MG/1
10 TABLET ORAL EVERY 4 HOURS PRN
Status: DISCONTINUED | OUTPATIENT
Start: 2024-04-18 | End: 2024-04-20 | Stop reason: HOSPADM

## 2024-04-18 RX ORDER — PROPOFOL 10 MG/ML
INJECTION, EMULSION INTRAVENOUS PRN
Status: DISCONTINUED | OUTPATIENT
Start: 2024-04-18 | End: 2024-04-18 | Stop reason: SDUPTHER

## 2024-04-18 RX ORDER — FENTANYL CITRATE 50 UG/ML
INJECTION, SOLUTION INTRAMUSCULAR; INTRAVENOUS PRN
Status: DISCONTINUED | OUTPATIENT
Start: 2024-04-18 | End: 2024-04-18 | Stop reason: SDUPTHER

## 2024-04-18 RX ORDER — ISOSORBIDE MONONITRATE 30 MG/1
30 TABLET, EXTENDED RELEASE ORAL DAILY
Status: DISCONTINUED | OUTPATIENT
Start: 2024-04-18 | End: 2024-04-18 | Stop reason: SDUPTHER

## 2024-04-18 RX ORDER — MULTIVITAMIN WITH IRON
1 TABLET ORAL DAILY
Status: DISCONTINUED | OUTPATIENT
Start: 2024-04-18 | End: 2024-04-20 | Stop reason: HOSPADM

## 2024-04-18 RX ADMIN — FERROUS SULFATE TAB 325 MG (65 MG ELEMENTAL FE) 325 MG: 325 (65 FE) TAB at 17:57

## 2024-04-18 RX ADMIN — CEFEPIME 2000 MG: 2 INJECTION, POWDER, FOR SOLUTION INTRAVENOUS at 05:30

## 2024-04-18 RX ADMIN — ROSUVASTATIN CALCIUM 20 MG: 20 TABLET, FILM COATED ORAL at 19:49

## 2024-04-18 RX ADMIN — FLUOXETINE 10 MG: 10 CAPSULE ORAL at 07:26

## 2024-04-18 RX ADMIN — VANCOMYCIN HYDROCHLORIDE 750 MG: 5 INJECTION, POWDER, LYOPHILIZED, FOR SOLUTION INTRAVENOUS at 09:45

## 2024-04-18 RX ADMIN — CARVEDILOL 3.12 MG: 3.12 TABLET, FILM COATED ORAL at 07:26

## 2024-04-18 RX ADMIN — MORPHINE SULFATE 4 MG: 4 INJECTION, SOLUTION INTRAMUSCULAR; INTRAVENOUS at 21:04

## 2024-04-18 RX ADMIN — SODIUM CHLORIDE, PRESERVATIVE FREE 10 ML: 5 INJECTION INTRAVENOUS at 19:49

## 2024-04-18 RX ADMIN — MIRTAZAPINE 15 MG: 15 TABLET, FILM COATED ORAL at 19:49

## 2024-04-18 RX ADMIN — ISOSORBIDE MONONITRATE 30 MG: 30 TABLET, EXTENDED RELEASE ORAL at 07:26

## 2024-04-18 RX ADMIN — HEPARIN SODIUM 5000 UNITS: 5000 INJECTION INTRAVENOUS; SUBCUTANEOUS at 05:31

## 2024-04-18 RX ADMIN — HYDRALAZINE HYDROCHLORIDE 5 MG: 20 INJECTION, SOLUTION INTRAMUSCULAR; INTRAVENOUS at 12:12

## 2024-04-18 RX ADMIN — INSULIN GLARGINE 9 UNITS: 100 INJECTION, SOLUTION SUBCUTANEOUS at 21:18

## 2024-04-18 RX ADMIN — SODIUM CHLORIDE: 9 INJECTION, SOLUTION INTRAVENOUS at 14:09

## 2024-04-18 RX ADMIN — LIDOCAINE HYDROCHLORIDE 50 MG: 20 INJECTION, SOLUTION EPIDURAL; INFILTRATION; INTRACAUDAL; PERINEURAL at 09:30

## 2024-04-18 RX ADMIN — FERROUS SULFATE TAB 325 MG (65 MG ELEMENTAL FE) 325 MG: 325 (65 FE) TAB at 07:26

## 2024-04-18 RX ADMIN — HEPARIN SODIUM 5000 UNITS: 5000 INJECTION INTRAVENOUS; SUBCUTANEOUS at 14:56

## 2024-04-18 RX ADMIN — CEFEPIME 2000 MG: 2 INJECTION, POWDER, FOR SOLUTION INTRAVENOUS at 18:39

## 2024-04-18 RX ADMIN — PROPOFOL 130 MG: 10 INJECTION, EMULSION INTRAVENOUS at 09:30

## 2024-04-18 RX ADMIN — FENTANYL CITRATE 25 MCG: 50 INJECTION, SOLUTION INTRAMUSCULAR; INTRAVENOUS at 09:30

## 2024-04-18 RX ADMIN — CARVEDILOL 3.12 MG: 3.12 TABLET, FILM COATED ORAL at 17:57

## 2024-04-18 RX ADMIN — HEPARIN SODIUM 5000 UNITS: 5000 INJECTION INTRAVENOUS; SUBCUTANEOUS at 21:09

## 2024-04-18 ASSESSMENT — PAIN SCALES - GENERAL
PAINLEVEL_OUTOF10: 6
PAINLEVEL_OUTOF10: 7

## 2024-04-18 ASSESSMENT — PAIN DESCRIPTION - DESCRIPTORS: DESCRIPTORS: SQUEEZING;ACHING

## 2024-04-18 ASSESSMENT — PAIN DESCRIPTION - ORIENTATION: ORIENTATION: LEFT

## 2024-04-18 ASSESSMENT — PAIN - FUNCTIONAL ASSESSMENT: PAIN_FUNCTIONAL_ASSESSMENT: PREVENTS OR INTERFERES SOME ACTIVE ACTIVITIES AND ADLS

## 2024-04-18 ASSESSMENT — PAIN DESCRIPTION - LOCATION: LOCATION: FOOT

## 2024-04-18 NOTE — PLAN OF CARE
Problem: ABCDS Injury Assessment  Goal: Absence of physical injury  4/18/2024 0134 by Reina Ashby RN  Outcome: Progressing     Problem: Skin/Tissue Integrity  Goal: Absence of new skin breakdown  Description: 1.  Monitor for areas of redness and/or skin breakdown  2.  Assess vascular access sites hourly  3.  Every 4-6 hours minimum:  Change oxygen saturation probe site  4.  Every 4-6 hours:  If on nasal continuous positive airway pressure, respiratory therapy assess nares and determine need for appliance change or resting period.  4/18/2024 0134 by Reina Ashby RN  Outcome: Progressing     Problem: Pain  Goal: Verbalizes/displays adequate comfort level or baseline comfort level  4/18/2024 0134 by Reina Ashby RN  Outcome: Progressing   Pain Assessment: None - Denies Pain  Pain Level: 0   Patient's Stated Pain Goal: 0 - No pain   Is pain goal met at this time?  Yes          Problem: Safety - Adult  Goal: Free from fall injury  4/18/2024 0134 by Reina Ashby RN  Outcome: Progressing   All fall precautions in place. Bed in low position, alarm activated and appropriate use of call light.     Problem: Discharge Planning  Goal: Discharge to home or other facility with appropriate resources  4/18/2024 0134 by Reina Ashby RN  Outcome: Progressing     Problem: Skin/Tissue Integrity - Adult  Goal: Incisions, wounds, or drain sites healing without S/S of infection  4/18/2024 0134 by Reina Ashby RN  Outcome: Progressing   Skin assessment completed.  Patient turned every 2 hours and as needed.  No skin breakdown this shift.      Problem: Infection - Adult  Goal: Absence of infection during hospitalization  4/18/2024 0134 by Reina Ashby RN  Outcome: Progressing     Problem: Chronic Conditions and Co-morbidities  Goal: Patient's chronic conditions and co-morbidity symptoms are monitored and maintained or improved  4/18/2024 0134 by Reina Ashby RN  Outcome: Progressing     Problem: Nutrition

## 2024-04-18 NOTE — ANESTHESIA PRE PROCEDURE
3.125 mg  3.125 mg Oral BID  OhOmar gray PA-C   3.125 mg at 04/18/24 0726   • aspirin chewable tablet 81 mg  81 mg Oral Daily Omar Gil PA-C   81 mg at 04/17/24 0822   • isosorbide mononitrate (IMDUR) extended release tablet 30 mg  30 mg Oral Daily Omar Gil PA-C   30 mg at 04/18/24 0726   • ferrous sulfate (IRON 325) tablet 325 mg  325 mg Oral BID  Omar Gil PA-C   325 mg at 04/18/24 0726   • [Held by provider] bumetanide (BUMEX) tablet 1 mg  1 mg Oral Q MWF Omar Gil PA-C       • [Held by provider] potassium bicarb-citric acid (EFFER-K) effervescent tablet 10 mEq  10 mEq Oral Q MWF Omar Gil PA-C       • FLUoxetine (PROZAC) capsule 10 mg  10 mg Oral Daily Omar Gil PA-C   10 mg at 04/18/24 0726   • mirtazapine (REMERON) tablet 15 mg  15 mg Oral Nightly Omar Gil PA-C   15 mg at 04/17/24 2000   • vancomycin (VANCOCIN) intermittent dosing (placeholder)   Other RX Placeholder Omar Gil PA-C           Allergies:  No Known Allergies    Problem List:    Patient Active Problem List   Diagnosis Code   • CAD (coronary artery disease) s/p MI and stent in the year 2000 at [facility] I25.10   • HTN (hypertension)- poorly controlled in the last one week- High SBP with physicians I10   • Chest pain at rest-NO CPsince June 30, 2012 R07.9   • Anxiety attacks F41.0   • DM (diabetes mellitus) ([facility]) E11.9   • Abnormal Lexiscan stress ECG withn ST depression of 1mm and down sloping wnd TWI in V4 to V6 and inferior leads June 30, 2012 R94.39   • Abnormal lexiscan EKG stress test R94.31   • Osteomyelitis of second toe of left foot (HCC) M86.9   • Stage 3a chronic kidney disease (HCC) N18.31   • Preoperative clearance Z01.818   • Hyponatremia E87.1   • Hyperkalemia E87.5   • Gangrene of toe of left foot (HCC) I96   • S/P angiogram of extremity Z98.890   • Hyperlipidemia LDL goal <50 E78.5       Past Medical History:        Diagnosis Date

## 2024-04-18 NOTE — CARE COORDINATION
4/18/24, 1:52 PM EDT    DISCHARGE ON GOING EVALUATION    Shelli DESIR [facility] day: 4  Location: -25/025-A Reason for admit: Osteomyelitis (HCC) [M86.9]  Gangrene of toe of left foot (HCC) [I96]  Osteomyelitis of second toe of left foot (HCC) [M86.9]     Procedures:   4/18 LEFT 2ND TOE AMPUTATION AND Left Fibular Sesamoidectomy       Barriers to Discharge:   Bumex remains on hold, Maxipime, Heparin subq, DM management, IV Vancomycin per pharmacy dosing, prn Tylenol and Zofran, BMP in a.m., clear liquid diet, PT/OT, up with assistance.     PCP: Gurmeet Reilly MD  Readmission Risk Score: 13.4%    Patient Goals/Plan/Treatment Preferences: Shelli resides at home with her daughter and granddaughter. Monitoring for therapy evaluations.

## 2024-04-18 NOTE — ANESTHESIA POSTPROCEDURE EVALUATION
Department of Anesthesiology  Postprocedure Note    Patient: Shelli Herndon  MRN: [id number]  YOB: 1935  Date of evaluation: 4/18/2024    Procedure Summary       Date: 04/18/24 Room / Location: [facility] OR [address] [place] [address] OR    Anesthesia Start: 0925 Anesthesia Stop: 1014    Procedure: LEFT 2ND TOE AMPUTATION AND TENOTOMY OF GREAT TOE (Left: Toes) Diagnosis:       Other acute osteomyelitis, other site (HCC)      (Other acute osteomyelitis, other site (HCC) [M86.18])    Surgeons: Simon Loredo DPM Responsible Provider: Cristian Esteban MD    Anesthesia Type: MAC, General ASA Status: 3            Anesthesia Type: MAC, General    Deion Phase I: Deion Score: 10    Deion Phase II:      Anesthesia Post Evaluation    Patient location during evaluation: bedside  Patient participation: complete - patient participated  Level of consciousness: awake  Pain score: 0  Airway patency: patent  Nausea & Vomiting: no vomiting  Cardiovascular status: blood pressure returned to baseline  Respiratory status: acceptable  Hydration status: stable  Pain management: adequate        No notable events documented.

## 2024-04-18 NOTE — PROGRESS NOTES
[facility]   Nephrology progress note  4/18/2024, 2:46 PM      Pt Name:    Shelli Herndon  MRN:     [id number]     YOB: 1935  Admit Date:    4/14/2024 12:45 PM    Chief Complaint: Nephrology following for CKD/clearance for arteriogram.    Subjective:  Patient seen and examined  No chest pain or shortness of breath  Had amputation done this morning .     Objective:  24HR INTAKE/OUTPUT:    Intake/Output Summary (Last 24 hours) at 4/18/2024 1446  Last data filed at 4/18/2024 0930  Gross per 24 hour   Intake 560 ml   Output --   Net 560 ml        Admission weight: 58.1 kg (128 lb)  Wt Readings from Last 3 Encounters:   04/16/24 58.8 kg (129 lb 11.2 oz)   01/17/19 58.7 kg (129 lb 6.4 oz)   08/30/16 57.8 kg (127 lb 6.4 oz)        Vitals :   Vitals:    04/18/24 1030 04/18/24 1045 04/18/24 1100 04/18/24 1130   BP: (!) 190/70 (!) 190/70 (!) 180/70 (!) 178/78   Pulse: 63 63 62 60   Resp:       Temp:       TempSrc:       SpO2: 97% 96% 97% 97%   Weight:       Height:           Physical examination  General Appearance:  Well developed. No distress  Mouth/Throat:  Oral mucosa moist  Neck:  Supple, no JVD  Lungs:  Breath sounds: clear  Heart::  S1,S2 heard  Abdomen:  Soft, non - tender  Musculoskeletal: Wound noted on the left foot    Medications:  Infusion:    sodium chloride      sodium chloride 20 mL/hr at 04/18/24 1409    dextrose       Meds:    [START ON 4/19/2024] sacubitril-valsartan  1 tablet Oral BID    vancomycin  750 mg IntraVENous Q24H    insulin glargine  0.15 Units/kg SubCUTAneous Nightly    rosuvastatin  20 mg Oral Nightly    insulin lispro  0-8 Units SubCUTAneous TID     insulin lispro  0-4 Units SubCUTAneous Nightly    cefepime  2,000 mg IntraVENous Q12H    sodium chloride flush  5-40 mL IntraVENous 2 times per day    clopidogrel  75 mg Oral Daily    sodium chloride flush  5-40 mL IntraVENous 2 times per day    heparin (porcine)  5,000 Units SubCUTAneous 3 times per day     carvedilol  3.125 mg Oral BID WC    aspirin  81 mg Oral Daily    isosorbide mononitrate  30 mg Oral Daily    ferrous sulfate  325 mg Oral BID WC    [Held by provider] bumetanide  1 mg Oral Q MWF    [Held by provider] potassium bicarb-citric acid  10 mEq Oral Q MWF    FLUoxetine  10 mg Oral Daily    mirtazapine  15 mg Oral Nightly    vancomycin (VANCOCIN) intermittent dosing (placeholder)   Other RX Placeholder       Lab Data :  CBC:   Recent Labs     04/16/24 0533 04/17/24 0517 04/18/24 0436   WBC 5.8 5.1 5.1   HGB 13.0 12.3 12.3   HCT 41.7 39.4 39.0    204 205       CMP:  Recent Labs     04/16/24 0533 04/17/24 0517 04/18/24 0436    138 139   K 5.0 4.4 4.3    109 112*   CO2 19* 17* 16*   BUN 16 14 14   CREATININE 1.0 1.0 1.0   GLUCOSE 124* 177* 169*   CALCIUM 8.0* 7.9* 8.2*       Hepatic:   No results for input(s): \"LABALBU\", \"AST\", \"ALT\", \"ALB\", \"BILITOT\", \"ALKPHOS\" in the last 72 hours.      Assessment and Plan:  Renal -patient appears to be having CKD stage III mostly due to senile nephrosclerosis and longstanding hypertension and diabetes.  She is also on multiple nephrotoxic agents.  Overall creatinine is better..  can start Bumex if needed  Status post arteriogram 4/15/2024 no signs of BEATRICE at this time  Overall creatinine maintaining stable  Electrolytes -within normal limits  Mild hyperkalemia secondary to possible Entresto low potassium diet currently doing better  Essential hypertension running reasonable Entresto restarted  Osteomyelitis of the left second toe seen by podiatry status post arteriogram with angioplasty 4/15/2024. For a Possible surgery tomorrow  Mild acidosis follow for now  Meds reviewed and discussed with patient     Duc Dee MD  [facility]    This report has been created using voice recognition software. It may contain minor errors which are inherent in voice recognition technology

## 2024-04-18 NOTE — PROGRESS NOTES
Progress note: Infectious diseases    Patient - Shelli Herndon,  Age - 88 y.o.    - 1935      Room Number - 5K-25/025-A   MRN -  [id number]   [facility] # - [id number]  Date of Admission -  2024 12:45 PM    SUBJECTIVE:   Afebrile overnight.  No new complaints.  Podiatry plans for procedure today.  OBJECTIVE   VITALS    height is 1.473 m (4' 10\") and weight is 58.8 kg (129 lb 11.2 oz). Her oral temperature is 97.6 °F (36.4 °C). Her blood pressure is 190/85 (abnormal) and her pulse is 75. Her respiration is 18 and oxygen saturation is 100%.       Wt Readings from Last 3 Encounters:   24 58.8 kg (129 lb 11.2 oz)   19 58.7 kg (129 lb 6.4 oz)   16 57.8 kg (127 lb 6.4 oz)       I/O (24 Hours)    Intake/Output Summary (Last 24 hours) at 2024 0825  Last data filed at 2024 0630  Gross per 24 hour   Intake 760 ml   Output --   Net 760 ml       General Appearance  Awake, alert, oriented,  not  In acute distress  HEENT - normocephalic, atraumatic, pink conjunctiva,  anicteric sclera  Neck - Supple, no mass  Lungs -  Bilateral good air entry, no rhonchi, no wheeze  Cardiovascular - Heart sounds are normal.  Regular rate and rhythm without murmur, gallop or rub.  Abdomen - soft, not distended, nontender,   Neurologic -alert awake oriented,    Skin - No bruising or bleeding  Extremities -dressed left foot ulceration    MEDICATIONS:      vancomycin  750 mg IntraVENous Q24H    insulin glargine  0.15 Units/kg SubCUTAneous Nightly    rosuvastatin  20 mg Oral Nightly    insulin lispro  0-8 Units SubCUTAneous TID     insulin lispro  0-4 Units SubCUTAneous Nightly    cefepime  2,000 mg IntraVENous Q12H    sodium chloride flush  5-40 mL IntraVENous 2 times per day    clopidogrel  75 mg Oral Daily    sodium chloride flush  5-40 mL IntraVENous 2 times per day    heparin (porcine)  5,000 Units SubCUTAneous 3 times

## 2024-04-18 NOTE — H&P
[facility]  History and Physical Update    Pt Name: Shelli Herndon  MRN: [id number]  YOB: 1935  Date of evaluation: 4/18/2024    I have examined the patient and reviewed the H&P/Consult and there are no changes to the patient or plans.      Simon Loredo DPM,FACFAS  Electronically signed 4/18/2024 at 10:51 AM

## 2024-04-18 NOTE — PROGRESS NOTES
Hospitalist Progress Note    Patient:  Shelli Herndon    YOB: 1935  Unit/Bed:[facility] OR (General) POOL R*  Date of Admission: 4/14/2024  Code Status: Full Code      Assessment/Plan:    OM of the left second digit of LLE: Noted on x-ray.  Follows with podiatry outpatient.  Noted to have MRSA and Enterobacter cloacae on recent culture 3/28/2024.  Podiatry following-s/p amputation of left second digit 4/18  Cardiology following-s/p angiogram with angioplasty as detailed below.  DAPT recommended  ID following-on vancomycin and cefepime    PAD: Recent arterial Doppler with chronic occlusion of right mid SFA, severely decreased bilateral ABIs, moderate stenosis of the left distal SFA  Patient underwent successful revascularization of totally occluded left SFA and popliteal segment on 4/15.  Recommends DAPT.  Patient will follow-up with cardiology outpatient for right LE angioplasty in 2 to 3 weeks.  Continue statin, aspirin and Plavix.    PEBBLES on CKD: Creatinine 1.3 on admission, returned to baseline, appears around 1.  Nephrology following.  No signs of BEATRICE s/p arteriogram 4/15/2024.  Discontinue IVF per neph and monitor kidney function.    Hyponatremia: Resolved.  Initially down to 131.    Hyperkalemia: Resolved.  Careful monitoring with resumption of diuretic and Entresto.    Lactic acidosis: Initially 2.7, since resolved following small bolus of IVF.    NIDDMII: Oral medications held.  Lantus 9 units initiated.  Low-dose sliding scale.  Carb controlled diet.  Accu-Cheks.  Hypoglycemia protocol.    Chronic HFrEF: Echo 2012 shows EF 55% with G1DD, results from [facility] requested. Does not appear acutely decompensated.  Bumex 1 mg 3 times weekly, neph following recommending Bumex as needed. Strict I&Os. Daily weights. Fluid/salt restriction.     Essential hypertension: Entresto continued, Imdur, carvedilol with hold parameters.    Hyperlipidemia: Statin, increased    CAD: Reports history of PCI/stenting.   Continue aspirin/Plavix/statin      LDA: []CVC / []PICC / []Midline / []Hillman / []Drains / []Mediport / [x]None  Antibiotics: Vanco and cefepime  Steroids: none  Labs (still needed?): [x]Yes / []No  IVF (still needed?): [x]Yes / []No    Level of care: []Step Down / [x]Med-Surg  Bed Status: [x]Inpatient / []Observation  Telemetry: []Yes / [x]No  PT/OT: [x]Yes / []No    DVT Prophylaxis: [] Lovenox / [] Heparin / [] SCDs / [] Already on Systemic Anticoagulation / [] None     Chief Complaint: Toe wound    HPI / Hospital Course:   Per H&P 4/14 \"Shelli Herndon is a 88 y.o. female with a history of peripheral artery disease, non-insulin-dependent type 2 diabetes mellitus, HFrEF, CKD, hypertension, hyperlipidemia, coronary artery disease, and left second digit foot wound who presented to [facility] with chief complaint of left second digit foot wound. The patient has been following with podiatry outpatient. She recently had the left digit wound cultured and it was positive for MRSA and Enterobacter cloacae. She was supposed to see cardiology in about 10 days for consideration of intervention to the lower extremities given peripheral arterial disease which was noted on an ultrasound, but the wound became worse so the patient sought treatment in the emergency department. X-rays in the emergency department were notable for findings consistent with osteomyelitis. The patient is being admitted for further evaluation. The patient denies any discomfort to the lower extremity. 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 No fevers or chills. The patient does appear to have some underlying CKD, although she and her daughter were not aware of this. She does not follow with a nephrologist. The patient has no other complaints at this time. She does take aspirin. \"     Subjective (past 24 hours):   4/18: Patient seen following amputation. Very drowsy but arousable. Denies chest pain, shortness of breath, nausea/vomiting, or pain at her amputation

## 2024-04-18 NOTE — OP NOTE
Operative Note      Patient: Shelli Herndon  YOB: 1935  MRN: [id number]    Date of Procedure: 4/18/2024    Pre-Op Diagnosis Codes:     * Other acute osteomyelitis, other site ([facility]) [M86.18]    Post-Op Diagnosis: Same       Procedure(s):  LEFT 2ND TOE AMPUTATION AND Left Fibular Sesamoidectomy    Surgeon(s):  Simon Loredo DPM    Assistant:  Resident: Sarbjit Caballero DPM    Anesthesia: General    Estimated Blood Loss (mL): Minimal    Complications: None    Specimens:   ID Type Source Tests Collected by Time Destination   A : Left 2nd Toe Bone Toe SURGICAL PATHOLOGY Simon Loredo DPM 4/18/2024 0949        Implants:  * No implants in log *      Drains: * No LDAs found *    Findings:  Consistent with diagnosis    Hemostasis: None    Injectables: 30cc 0.5% marcaine plain    Materials: 3-0 Vicryl 4-0 Monocryl      Indications: Patient is a 88 y.o. female with a chief complaint of left foot pain who is being seen by Dr. Loredo and being treated for osteomyelitis of left 2nd digit.  At this time all conservative options have been exhausted and surgical intervention is necessary.  The procedure has been explained to the patient and they understand the risks, benefits and possible complications including infection, loss of limb, loss of life.  No promises have been made as to surgical outcome.    Procedure:   The patient was transported from the pre-op holding to the operating room and placed in a supine position.  A pre-operative injection of 30cc of 0.5% marcaine plain was injected into the left foot in a digital block.  The left foot was then prepped and draped in the normal aspetic manner.      Attention was then directed to the left 2nddigit. A skin marker was used to create a racquet type incision line. Forceps were then applied to the digit to check for proper anesthesia. A towel clamp was then used to grasp the distal aspect of the toe. A full thickness incision was made down to the level of bone. The

## 2024-04-18 NOTE — BRIEF OP NOTE
Brief Postoperative Note      Patient: Shelli Herndon  YOB: 1935  MRN: [id number]    Date of Procedure: 4/18/2024    Pre-Op Diagnosis Codes:     * Other acute osteomyelitis, other site ([facility]) [M86.18]    Post-Op Diagnosis: Same       Procedure(s):  LEFT 2ND TOE AMPUTATION AND Left Fibular Sesamoidectomy    Surgeon(s):  Simon Loredo DPM    Assistant:  Resident: Sarbjit Caballero DPM    Anesthesia: General    Estimated Blood Loss (mL): Minimal    Complications: None    Specimens:   ID Type Source Tests Collected by Time Destination   A : Left 2nd Toe Bone Toe SURGICAL PATHOLOGY Simon Loredo DPM 4/18/2024 0949        Implants:  * No implants in log *      Drains: * No LDAs found *    Findings:  Consistent with diagnosis    Hemostasis: None    Injectables: 30cc 0.5% marcaine plain    Materials: 3-0 Vicryl 4-0 Monocryl    Electronically signed by Sarbjit Caballero DPM on 4/18/2024 at 10:12 AM

## 2024-04-18 NOTE — PLAN OF CARE
Problem: ABCDS Injury Assessment  Goal: Absence of physical injury  4/18/2024 1500 by Citlalli Watson RN  Outcome: Progressing     Problem: Skin/Tissue Integrity  Goal: Absence of new skin breakdown  Description: 1.  Monitor for areas of redness and/or skin breakdown  2.  Assess vascular access sites hourly  3.  Every 4-6 hours minimum:  Change oxygen saturation probe site  4.  Every 4-6 hours:  If on nasal continuous positive airway pressure, respiratory therapy assess nares and determine need for appliance change or resting period.  4/18/2024 1500 by Citlalli Watson RN  Outcome: Progressing     Problem: Pain  Goal: Verbalizes/displays adequate comfort level or baseline comfort level  4/18/2024 1500 by Citlalli Watson RN  Outcome: Progressing     Problem: Safety - Adult  Goal: Free from fall injury  4/18/2024 1500 by Citlalli Watson RN  Outcome: Progressing     Problem: Discharge Planning  Goal: Discharge to home or other facility with appropriate resources  4/18/2024 1500 by Citlalli Watson RN  Outcome: Progressing     Problem: Skin/Tissue Integrity - Adult  Goal: Incisions, wounds, or drain sites healing without S/S of infection  4/18/2024 1500 by Citlalli Watson RN  Outcome: Progressing     Problem: Infection - Adult  Goal: Absence of infection during hospitalization  4/18/2024 1500 by Citlalli Watson RN  Outcome: Progressing     Problem: Chronic Conditions and Co-morbidities  Goal: Patient's chronic conditions and co-morbidity symptoms are monitored and maintained or improved  4/18/2024 1500 by Citlalli Watson RN  Outcome: Progressing     Problem: Nutrition Deficit:  Goal: Optimize nutritional status  4/18/2024 1500 by Citlalli Watson RN  Outcome: Progressing    Care plan reviewed with patient , patient verbalized understanding of plan of care and contributed to goal setting.

## 2024-04-19 LAB
ANION GAP SERPL CALC-SCNC: 10 MEQ/L (ref 8–16)
BACTERIA BLD AEROBE CULT: NORMAL
BACTERIA BLD AEROBE CULT: NORMAL
BUN SERPL-MCNC: 14 MG/DL (ref 7–22)
CALCIUM SERPL-MCNC: 8.1 MG/DL (ref 8.5–10.5)
CHLORIDE SERPL-SCNC: 108 MEQ/L (ref 98–111)
CO2 SERPL-SCNC: 18 MEQ/L (ref 23–33)
CREAT SERPL-MCNC: 1 MG/DL (ref 0.4–1.2)
GFR SERPL CREATININE-BSD FRML MDRD: 55 ML/MIN/1.73M2
GLUCOSE BLD STRIP.AUTO-MCNC: 117 MG/DL (ref 70–108)
GLUCOSE BLD STRIP.AUTO-MCNC: 256 MG/DL (ref 70–108)
GLUCOSE BLD STRIP.AUTO-MCNC: 266 MG/DL (ref 70–108)
GLUCOSE BLD STRIP.AUTO-MCNC: 316 MG/DL (ref 70–108)
GLUCOSE SERPL-MCNC: 149 MG/DL (ref 70–108)
POTASSIUM SERPL-SCNC: 4.3 MEQ/L (ref 3.5–5.2)
SODIUM SERPL-SCNC: 136 MEQ/L (ref 135–145)
VANCOMYCIN SERPL-MCNC: 16.2 UG/ML (ref 0.1–39.9)

## 2024-04-19 PROCEDURE — 6370000000 HC RX 637 (ALT 250 FOR IP): Performed by: PODIATRIST

## 2024-04-19 PROCEDURE — 97110 THERAPEUTIC EXERCISES: CPT

## 2024-04-19 PROCEDURE — 99232 SBSQ HOSP IP/OBS MODERATE 35: CPT

## 2024-04-19 PROCEDURE — 2580000003 HC RX 258: Performed by: PODIATRIST

## 2024-04-19 PROCEDURE — 6360000002 HC RX W HCPCS: Performed by: PODIATRIST

## 2024-04-19 PROCEDURE — 97162 PT EVAL MOD COMPLEX 30 MIN: CPT

## 2024-04-19 PROCEDURE — 80048 BASIC METABOLIC PNL TOTAL CA: CPT

## 2024-04-19 PROCEDURE — 6370000000 HC RX 637 (ALT 250 FOR IP)

## 2024-04-19 PROCEDURE — 36415 COLL VENOUS BLD VENIPUNCTURE: CPT

## 2024-04-19 PROCEDURE — 97165 OT EVAL LOW COMPLEX 30 MIN: CPT

## 2024-04-19 PROCEDURE — 1200000003 HC TELEMETRY R&B

## 2024-04-19 PROCEDURE — 80202 ASSAY OF VANCOMYCIN: CPT

## 2024-04-19 PROCEDURE — 97530 THERAPEUTIC ACTIVITIES: CPT

## 2024-04-19 PROCEDURE — 99232 SBSQ HOSP IP/OBS MODERATE 35: CPT | Performed by: INTERNAL MEDICINE

## 2024-04-19 PROCEDURE — 82948 REAGENT STRIP/BLOOD GLUCOSE: CPT

## 2024-04-19 RX ORDER — SULFAMETHOXAZOLE AND TRIMETHOPRIM 800; 160 MG/1; MG/1
1 TABLET ORAL EVERY 12 HOURS SCHEDULED
Status: DISCONTINUED | OUTPATIENT
Start: 2024-04-19 | End: 2024-04-20 | Stop reason: HOSPADM

## 2024-04-19 RX ADMIN — FERROUS SULFATE TAB 325 MG (65 MG ELEMENTAL FE) 325 MG: 325 (65 FE) TAB at 16:31

## 2024-04-19 RX ADMIN — CEFEPIME 2000 MG: 2 INJECTION, POWDER, FOR SOLUTION INTRAVENOUS at 05:19

## 2024-04-19 RX ADMIN — SODIUM CHLORIDE, PRESERVATIVE FREE 10 ML: 5 INJECTION INTRAVENOUS at 20:48

## 2024-04-19 RX ADMIN — INSULIN LISPRO 4 UNITS: 100 INJECTION, SOLUTION INTRAVENOUS; SUBCUTANEOUS at 13:06

## 2024-04-19 RX ADMIN — HEPARIN SODIUM 5000 UNITS: 5000 INJECTION INTRAVENOUS; SUBCUTANEOUS at 05:16

## 2024-04-19 RX ADMIN — SACUBITRIL AND VALSARTAN 1 TABLET: 24; 26 TABLET, FILM COATED ORAL at 08:36

## 2024-04-19 RX ADMIN — SODIUM CHLORIDE, PRESERVATIVE FREE 10 ML: 5 INJECTION INTRAVENOUS at 20:47

## 2024-04-19 RX ADMIN — ASPIRIN 81 MG 81 MG: 81 TABLET ORAL at 08:36

## 2024-04-19 RX ADMIN — MIRTAZAPINE 15 MG: 15 TABLET, FILM COATED ORAL at 20:50

## 2024-04-19 RX ADMIN — INSULIN LISPRO 4 UNITS: 100 INJECTION, SOLUTION INTRAVENOUS; SUBCUTANEOUS at 20:50

## 2024-04-19 RX ADMIN — INSULIN LISPRO 4 UNITS: 100 INJECTION, SOLUTION INTRAVENOUS; SUBCUTANEOUS at 16:31

## 2024-04-19 RX ADMIN — FLUOXETINE 10 MG: 10 CAPSULE ORAL at 08:36

## 2024-04-19 RX ADMIN — CLOPIDOGREL BISULFATE 75 MG: 75 TABLET ORAL at 08:36

## 2024-04-19 RX ADMIN — FERROUS SULFATE TAB 325 MG (65 MG ELEMENTAL FE) 325 MG: 325 (65 FE) TAB at 08:36

## 2024-04-19 RX ADMIN — SACUBITRIL AND VALSARTAN 1 TABLET: 24; 26 TABLET, FILM COATED ORAL at 20:49

## 2024-04-19 RX ADMIN — SULFAMETHOXAZOLE AND TRIMETHOPRIM 1 TABLET: 800; 160 TABLET ORAL at 16:31

## 2024-04-19 RX ADMIN — SODIUM CHLORIDE, PRESERVATIVE FREE 10 ML: 5 INJECTION INTRAVENOUS at 15:26

## 2024-04-19 RX ADMIN — ISOSORBIDE MONONITRATE 30 MG: 30 TABLET, EXTENDED RELEASE ORAL at 08:36

## 2024-04-19 RX ADMIN — ROSUVASTATIN CALCIUM 20 MG: 20 TABLET, FILM COATED ORAL at 20:49

## 2024-04-19 RX ADMIN — SODIUM CHLORIDE, PRESERVATIVE FREE 10 ML: 5 INJECTION INTRAVENOUS at 08:37

## 2024-04-19 RX ADMIN — INSULIN GLARGINE 9 UNITS: 100 INJECTION, SOLUTION SUBCUTANEOUS at 20:50

## 2024-04-19 RX ADMIN — HEPARIN SODIUM 5000 UNITS: 5000 INJECTION INTRAVENOUS; SUBCUTANEOUS at 15:26

## 2024-04-19 RX ADMIN — CARVEDILOL 3.12 MG: 3.12 TABLET, FILM COATED ORAL at 16:31

## 2024-04-19 RX ADMIN — Medication 1 TABLET: at 08:36

## 2024-04-19 RX ADMIN — CARVEDILOL 3.12 MG: 3.12 TABLET, FILM COATED ORAL at 08:36

## 2024-04-19 RX ADMIN — HEPARIN SODIUM 5000 UNITS: 5000 INJECTION INTRAVENOUS; SUBCUTANEOUS at 23:00

## 2024-04-19 NOTE — PROGRESS NOTES
[facility]  INPATIENT PHYSICAL THERAPY  EVALUATION  [facility] ONC MED 5K - 5K-25/025-A    Time In: 0957  Time Out: 1029  Timed Code Treatment Minutes: 23 Minutes  Minutes: 32          Date: 2024  Patient Name: Shelli Herndon,  Gender:  female        MRN: [id number]  : 1935  (88 y.o.)      Referring Practitioner: Sarbjit Caballero DPM  Diagnosis: osteomyelitis of seond toe of left foot  Additional Pertinent Hx: From H&P: \"Shelli Herndon is a 88 y.o. female with a history of peripheral artery disease, non-insulin-dependent type 2 diabetes mellitus, HFrEF, CKD, hypertension, hyperlipidemia, coronary artery disease, and left second digit foot wound who presented to [facility] with chief complaint of left second digit foot wound.  The patient has been following with podiatry outpatient.  She recently had the left digit wound cultured and it was positive for MRSA and Enterobacter cloacae.  She was supposed to see cardiology in about 10 days for consideration of intervention to the lower extremities given peripheral arterial disease which was noted on an ultrasound, but the wound became worse so the patient sought treatment in the emergency department.  X-rays in the emergency department were notable for findings consistent with osteomyelitis.  The patient is being admitted for further evaluation.  The patient denies any discomfort to the lower extremity.  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  No fevers or chills.  The patient does appear to have some underlying CKD, although she and her daughter were not aware of this.  She does not follow with a nephrologist.  The patient has no other complaints at this time.  She does take aspirin.\" Pt underwent LEFT 2ND TOE AMPUTATION AND Left Fibular Sesamoidectomy on 24.     Restrictions/Precautions:  Restrictions/Precautions: General Precautions, Fall Risk, Contact Precautions, Weight Bearing  Left Lower Extremity Weight Bearing: Weight Bearing As Tolerated

## 2024-04-19 NOTE — PROGRESS NOTES
[facility]  INPATIENT OCCUPATIONAL THERAPY  [facility] ONC MED 5K  EVALUATION    Time:   Time In: 1131  Time Out: 1149  Timed Code Treatment Minutes: 9 Minutes  Minutes: 18          Date: 2024  Patient Name: Shelli Herndon,   Gender: female      MRN: [id number]  : 1935  (88 y.o.)  Referring Practitioner: Sarjbit Caballero DPM  Diagnosis: osteomyelitis of second toe of left foot  Additional Pertinent Hx: per chart review; \"Shelli Herndon is a 88 y.o. female with a history of peripheral artery disease, non-insulin-dependent type 2 diabetes mellitus, HFrEF, CKD, hypertension, hyperlipidemia, coronary artery disease, and left second digit foot wound who presented to [facility] with chief complaint of left second digit foot wound. The patient has been following with podiatry outpatient. She recently had the left digit wound cultured and it was positive for MRSA and Enterobacter cloacae. She was supposed to see cardiology in about 10 days for consideration of intervention to the lower extremities given peripheral arterial disease which was noted on an ultrasound, but the wound became worse so the patient sought treatment in the emergency department. X-rays in the emergency department were notable for findings consistent with osteomyelitis. The patient is being admitted for further evaluation. The patient denies any discomfort to the lower extremity. 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 No fevers or chills. The patient does appear to have some underlying CKD, although she and her daughter were not aware of this. She does not follow with a nephrologist. The patient has no other complaints at this time. She does take aspirin.\" patient underwent a LEFT 2ND TOE AMPUTATION AND TENOTOMY OF GREAT TOE on 24.    Restrictions/Precautions:  Restrictions/Precautions: General Precautions, Fall Risk, Contact Precautions, Weight Bearing  Left Lower Extremity Weight Bearing: Weight Bearing As Tolerated (in post op  shoe)    Subjective  Chart Reviewed: Yes, Orders, Progress Notes, History and Physical, Operative Notes  Patient assessed for rehabilitation services?: Yes  Family / Caregiver Present: No    Subjective: RN approved session, first attempt, patient working with PT, second attempt, patient seated up in recliner upon OT arrival and agreeable to eval. patient A & O x 4.    Pain:  reports mild discomfort in L foot. Did not rate. More discomfort from fit of post op shoe.     Vitals: Vitals not assessed per clinical judgement, see nursing flowsheet    Social/Functional History:  Lives With: Daughter  Type of Home: House  Home Layout: Able to Live on Main level with bedroom/bathroom, Performs ADL's on one level, One level  Home Access: Stairs to enter with rails  Entrance Stairs - Number of Steps: 3 MARINA  Entrance Stairs - Rails: Both  Home Equipment: Cane, Walker, rolling   Bathroom Shower/Tub:  (takes sponge baths only)  Bathroom Toilet: Standard  Bathroom Accessibility: Accessible    Receives Help From: Family  ADL Assistance: Independent  Homemaking Assistance: Independent  Ambulation Assistance: Independent  Transfer Assistance: Independent    Active : No  Patient's  Info: daughter's  Occupation: Retired  Additional Comments: Pt using no AD prior to admission; family member always home with pt    VISION:WFL    HEARING:   hard of hearing    COGNITION: Decreased Insight and Decreased Problem Solving    RANGE OF MOTION:  Bilateral Upper Extremity:  WFL    STRENGTH:  Bilateral Upper Extremity:  shldr 4/5 elbow flex 4/5 ext 4-/5  (F-)    SENSATION:   WFL    ADL:   Footwear Management: Stand By Assistance.  To doff/don R slipper sock seated in recliner  -patient declined any other ADLs including oral care .    IADL:   Not Tested    BALANCE:  Sitting Balance:  Supervision.    Standing Balance: Contact Guard Assistance. With use of 2 w/w for support    BED MOBILITY:  Not Tested    TRANSFERS:  Sit to Stand:

## 2024-04-19 NOTE — PROGRESS NOTES
[facility]   Nephrology progress note  4/19/2024, 10:30 AM      Pt Name:    Shelli Herndon  MRN:     [id number]     YOB: 1935  Admit Date:    4/14/2024 12:45 PM    Chief Complaint: Nephrology following for CKD/clearance for arteriogram.    Subjective:  Patient seen and examined  No chest pain or shortness of breath  Eager to go home    Objective:  24HR INTAKE/OUTPUT:    Intake/Output Summary (Last 24 hours) at 4/19/2024 1030  Last data filed at 4/19/2024 0612  Gross per 24 hour   Intake 370 ml   Output --   Net 370 ml        Admission weight: 58.1 kg (128 lb)  Wt Readings from Last 3 Encounters:   04/19/24 63.5 kg (140 lb)   01/17/19 58.7 kg (129 lb 6.4 oz)   08/30/16 57.8 kg (127 lb 6.4 oz)        Vitals :   Vitals:    04/18/24 2134 04/19/24 0500 04/19/24 0528 04/19/24 0815   BP:  (!) 161/71  (!) 177/81   Pulse:  83  80   Resp: 16 16  16   Temp:  98.2 °F (36.8 °C)  98.5 °F (36.9 °C)   TempSrc:  Oral  Oral   SpO2:  97%  96%   Weight:   63.5 kg (140 lb)    Height:           Physical examination  General Appearance:  Well developed. No distress  Mouth/Throat:  Oral mucosa moist  Neck:  Supple, no JVD  Lungs:  Breath sounds: clear  Heart::  S1,S2 heard  Abdomen:  Soft, non - tender  Musculoskeletal: Wound noted on the left foot    Medications:  Infusion:    sodium chloride      sodium chloride      sodium chloride 20 mL/hr at 04/18/24 1409    dextrose       Meds:    sulfamethoxazole-trimethoprim  1 tablet Oral 2 times per day    sacubitril-valsartan  1 tablet Oral BID    multivitamin  1 tablet Oral Daily    sodium chloride flush  5-40 mL IntraVENous 2 times per day    insulin glargine  0.15 Units/kg SubCUTAneous Nightly    rosuvastatin  20 mg Oral Nightly    insulin lispro  0-8 Units SubCUTAneous TID WC    insulin lispro  0-4 Units SubCUTAneous Nightly    sodium chloride flush  5-40 mL IntraVENous 2 times per day    clopidogrel  75 mg Oral Daily    sodium chloride flush  5-40 mL

## 2024-04-19 NOTE — PROGRESS NOTES
Progress note: Infectious diseases    Patient - Shelli Herndon,  Age - 88 y.o.    - 1935      Room Number - 5K-25/025-A   MRN -  [id number]   [facility] # - [id number]  Date of Admission -  2024 12:45 PM    SUBJECTIVE:   No new complaints. Stump is healing well.  OBJECTIVE   VITALS    height is 1.473 m (4' 10\") and weight is 63.5 kg (140 lb). Her oral temperature is 98.5 °F (36.9 °C). Her blood pressure is 177/81 (abnormal) and her pulse is 80. Her respiration is 16 and oxygen saturation is 96%.       Wt Readings from Last 3 Encounters:   24 63.5 kg (140 lb)   19 58.7 kg (129 lb 6.4 oz)   16 57.8 kg (127 lb 6.4 oz)       I/O (24 Hours)    Intake/Output Summary (Last 24 hours) at 2024 0956  Last data filed at 2024 0612  Gross per 24 hour   Intake 370 ml   Output --   Net 370 ml       General Appearance  Awake, alert, oriented,  not  In acute distress  HEENT - normocephalic, atraumatic, pink conjunctiva,  anicteric sclera  Neck - Supple, no mass  Lungs -  Bilateral good air entry, no rhonchi, no wheeze  Cardiovascular - Heart sounds are normal.  Regular rate and rhythm without murmur, gallop or rub.  Abdomen - soft, not distended, nontender,   Neurologic -oriented  Skin - No bruising or bleeding  Extremities -left foot dressing present.  Stump is healing well.    MEDICATIONS:      sulfamethoxazole-trimethoprim  1 tablet Oral 2 times per day    sacubitril-valsartan  1 tablet Oral BID    multivitamin  1 tablet Oral Daily    sodium chloride flush  5-40 mL IntraVENous 2 times per day    insulin glargine  0.15 Units/kg SubCUTAneous Nightly    rosuvastatin  20 mg Oral Nightly    insulin lispro  0-8 Units SubCUTAneous TID WC    insulin lispro  0-4 Units SubCUTAneous Nightly    sodium chloride flush  5-40 mL IntraVENous 2 times per day    clopidogrel  75 mg Oral Daily    sodium chloride flush  5-40 mL

## 2024-04-19 NOTE — PLAN OF CARE
Problem: ABCDS Injury Assessment  Goal: Absence of physical injury  4/18/2024 2313 by Reina Ashby RN  Outcome: Progressing     Problem: Skin/Tissue Integrity  Goal: Absence of new skin breakdown  Description: 1.  Monitor for areas of redness and/or skin breakdown  2.  Assess vascular access sites hourly  3.  Every 4-6 hours minimum:  Change oxygen saturation probe site  4.  Every 4-6 hours:  If on nasal continuous positive airway pressure, respiratory therapy assess nares and determine need for appliance change or resting period.  4/18/2024 2313 by Reina Ashby RN  Outcome: Progressing     Problem: Pain  Goal: Verbalizes/displays adequate comfort level or baseline comfort level  4/18/2024 2313 by Reina Ashby RN  Outcome: Progressing   Pain Assessment: None - Denies Pain  Pain Level: 7   Patient's Stated Pain Goal: 0 - No pain   Is pain goal met at this time?  Yes     Non-Pharmaceutical Pain Intervention(s): Ice    Problem: Safety - Adult  Goal: Free from fall injury  4/18/2024 2313 by Reina Ashby RN  Outcome: Progressing   All fall precautions in place. Bed in low position, alarm activated and appropriate use of call light.     Problem: Discharge Planning  Goal: Discharge to home or other facility with appropriate resources  4/18/2024 2313 by Reina Ashby RN  Outcome: Progressing     Problem: Skin/Tissue Integrity - Adult  Goal: Incisions, wounds, or drain sites healing without S/S of infection  4/18/2024 2313 by Reina Ashby RN  Outcome: Progressing   Skin assessment completed.  Patient turned every 2 hours and as needed.  No skin breakdown this shift.      Problem: Infection - Adult  Goal: Absence of infection during hospitalization  4/18/2024 2313 by Reina Ashby RN  Outcome: Progressing     Problem: Chronic Conditions and Co-morbidities  Goal: Patient's chronic conditions and co-morbidity symptoms are monitored and maintained or improved  4/18/2024 2313 by Reina Ashby RN  Outcome:

## 2024-04-19 NOTE — PROGRESS NOTES
last 72 hours.  No results for input(s): \"CKTOTAL\", \"TROPONINI\" in the last 72 hours.  Urinalysis:   Lab Results   Component Value Date/Time    NITRU 2+ 03/08/2024 08:42 AM    WBCUA >100 03/08/2024 08:42 AM    BACTERIA 1+ 03/08/2024 08:42 AM    RBCUA 21-50 03/08/2024 08:42 AM    SPECGRAV 1.020 03/08/2024 08:42 AM    GLUCOSEU >1000 mg/dL 03/08/2024 08:42 AM     Urine culture:   Lab Results   Component Value Date/Time    LABURIN  04/17/2023 04:45 PM      Comment:      Urine culture      Micro:   Blood culture #1:   Lab Results   Component Value Date/Time    BC No growth 24 hours. No growth 48 hours. 04/14/2024 01:35 PM    BC No growth 24 hours. No growth 48 hours. 04/14/2024 01:35 PM     Blood culture #2:No results found for: \"BLOODCULT2\"  Organism:  Lab Results   Component Value Date/Time    ORG Staphylococcus aureus 04/14/2024 02:50 PM    ORG Streptococcus agalactiae - (Group B) 04/14/2024 02:50 PM         Lab Results   Component Value Date/Time    LABGRAM  04/14/2024 02:50 PM     Rare segmented neutrophils observed. No epithelial cells observed. Moderate gram positive cocci occurring singly and in pairs.     MRSA culture only:No results found for: \"MRSAC\"  Respiratory culture: No results found for: \"CULTRESP\"  Aerobic and Anaerobic :  Lab Results   Component Value Date/Time    LABAERO  04/14/2024 02:50 PM     Culture also yielded moderate growth of Staphylococcus species (coagualse negative). At least one of drugs in current antibiotic therapy is ineffective in vitro for isolates.    LABAERO  04/14/2024 02:50 PM     moderate growth In the treatment of gram positive infections, GENTAMICIN should be CONSIDERED a SYNERGYSTIC agent ONLY. Ciprofloxacin and Levofloxacin, regardless of in vitro sensitivity, should not be used for staphylococcal infections other than uncomplicated lower UTIs. Moxifloxacin, regardless of in vitro sensitivity, should not be used for staphylococcal infections.       LABAERO  04/14/2024 02:50  electronically signed by: Pat Harrison MD on 04/15/2024 06:55 AM    XR TOE LEFT (MIN 2 VIEWS)    Result Date: 4/14/2024  PROCEDURE: XR TOE LEFT (MIN 2 VIEWS) CLINICAL INFORMATION: Gangrenous second toe TECHNIQUE: 4 views of the left foot focusing on the second digit COMPARISON: None FINDINGS: The distal toes are poorly visualized due to patient thickening. The distal second toe completely visualized. There is no fracture or dislocation. Bones are osteopenic. There is suggestion of osseous erosion at the distal second toe. There is a  broken osteophyte at the plantar surface of the calcaneus. An additional osteophyte is present at the posterior surface of the calcaneus.     1. No fracture or dislocation. 2. Possible osseous erosion at the distal second toe suspicious for osteomyelitis. Final report electronically signed by Dr. Phillip Jc on 4/14/2024 2:19 PM      Electronically signed by Wen Medina PA-C on 4/19/2024 at 9:25 AM

## 2024-04-19 NOTE — PROGRESS NOTES
Podiatric Surgery Progress Note    SUBJECTIVE:  4/19:  Patient seen at bedside on behalf of Dr. Loredo. Patient states her left foot is not painful, she denies any nausea, fever, vomiting, chills, shortness of breath or chest pain. Patient is 1 day s/p L 2nd digit amp. No other acute concerns at this time.    4/16:  Patient seen at bedside alongside of Dr. Loredo. Patient states her left foot is still painful, she denies any nausea, fever, vomiting, chills, shortness of breath or chest pain. Patient had vascular procedure done yesterday, successfully. No other acute concerns at this time.    4/15:  Patient seen at bedside on behalf of Dr. Loredo. Patient states her left foot is still painful, she denies any nausea, fever, vomiting, chills, shortness of breath or chest pain. She is aware of the plan for vascular intervention, spoke to nephrology who will be running tests this AM to clear for IV contrast. No other acute concerns at this time.    HISTORY OF PRESENT ILLNESS:                The patient is a 88 y.o. female with significant past medical history of CAD, DM, heart attack, hyperlipidemia, hyptension who is being seen at bedside on behalf of Dr. Loredo . Patient states she has been followed by Dr. Sierra and Dr. Loredo outpatient.  Patient is been undergoing local wound care to the left second digit where previously at the distal tuft there is an open ulceration with local wound care has since resolved.  Due to patient's hallux valgus deformity they have been applying Betadine with interdigital gauze to alleviate the rubbing and pressure.  Patient is accompanied today by son and daughter in the room providing additional history.  They state that this wound was not as red and edematous on Wednesday when seen in office.  Due to increased redness and swelling of coming emergency department for further workup and evaluation.  Patient states that pain is 5/10.  Patient denies any nausea, fever, vomiting, chills, shortness of

## 2024-04-20 VITALS
DIASTOLIC BLOOD PRESSURE: 66 MMHG | HEART RATE: 74 BPM | OXYGEN SATURATION: 98 % | RESPIRATION RATE: 18 BRPM | SYSTOLIC BLOOD PRESSURE: 152 MMHG | BODY MASS INDEX: 29.39 KG/M2 | HEIGHT: 58 IN | TEMPERATURE: 97.7 F | WEIGHT: 140 LBS

## 2024-04-20 LAB
ANION GAP SERPL CALC-SCNC: 11 MEQ/L (ref 8–16)
BUN SERPL-MCNC: 15 MG/DL (ref 7–22)
CALCIUM SERPL-MCNC: 8.2 MG/DL (ref 8.5–10.5)
CHLORIDE SERPL-SCNC: 108 MEQ/L (ref 98–111)
CO2 SERPL-SCNC: 17 MEQ/L (ref 23–33)
CREAT SERPL-MCNC: 1 MG/DL (ref 0.4–1.2)
GFR SERPL CREATININE-BSD FRML MDRD: 55 ML/MIN/1.73M2
GLUCOSE BLD STRIP.AUTO-MCNC: 102 MG/DL (ref 70–108)
GLUCOSE BLD STRIP.AUTO-MCNC: 304 MG/DL (ref 70–108)
GLUCOSE SERPL-MCNC: 132 MG/DL (ref 70–108)
POTASSIUM SERPL-SCNC: 4.3 MEQ/L (ref 3.5–5.2)
SODIUM SERPL-SCNC: 136 MEQ/L (ref 135–145)

## 2024-04-20 PROCEDURE — 6360000002 HC RX W HCPCS: Performed by: PODIATRIST

## 2024-04-20 PROCEDURE — 6370000000 HC RX 637 (ALT 250 FOR IP): Performed by: PODIATRIST

## 2024-04-20 PROCEDURE — 99239 HOSP IP/OBS DSCHRG MGMT >30: CPT

## 2024-04-20 PROCEDURE — 6370000000 HC RX 637 (ALT 250 FOR IP)

## 2024-04-20 PROCEDURE — 2580000003 HC RX 258: Performed by: PODIATRIST

## 2024-04-20 PROCEDURE — 80048 BASIC METABOLIC PNL TOTAL CA: CPT

## 2024-04-20 PROCEDURE — 36415 COLL VENOUS BLD VENIPUNCTURE: CPT

## 2024-04-20 PROCEDURE — 82948 REAGENT STRIP/BLOOD GLUCOSE: CPT

## 2024-04-20 PROCEDURE — 6360000002 HC RX W HCPCS

## 2024-04-20 RX ORDER — SULFAMETHOXAZOLE AND TRIMETHOPRIM 800; 160 MG/1; MG/1
1 TABLET ORAL EVERY 12 HOURS SCHEDULED
Qty: 8 TABLET | Refills: 0 | Status: SHIPPED | OUTPATIENT
Start: 2024-04-20 | End: 2024-04-24

## 2024-04-20 RX ORDER — INSULIN LISPRO 100 [IU]/ML
0-16 INJECTION, SOLUTION INTRAVENOUS; SUBCUTANEOUS
Status: DISCONTINUED | OUTPATIENT
Start: 2024-04-20 | End: 2024-04-20 | Stop reason: HOSPADM

## 2024-04-20 RX ORDER — INSULIN LISPRO 100 [IU]/ML
0-4 INJECTION, SOLUTION INTRAVENOUS; SUBCUTANEOUS NIGHTLY
Status: DISCONTINUED | OUTPATIENT
Start: 2024-04-20 | End: 2024-04-20 | Stop reason: HOSPADM

## 2024-04-20 RX ORDER — SULFAMETHOXAZOLE AND TRIMETHOPRIM 800; 160 MG/1; MG/1
1 TABLET ORAL EVERY 12 HOURS SCHEDULED
Qty: 8 TABLET | Refills: 0 | Status: CANCELLED | OUTPATIENT
Start: 2024-04-20 | End: 2024-04-24

## 2024-04-20 RX ORDER — HYDRALAZINE HYDROCHLORIDE 20 MG/ML
5 INJECTION INTRAMUSCULAR; INTRAVENOUS EVERY 6 HOURS PRN
Status: DISCONTINUED | OUTPATIENT
Start: 2024-04-20 | End: 2024-04-20 | Stop reason: HOSPADM

## 2024-04-20 RX ORDER — INSULIN GLARGINE 100 [IU]/ML
0.2 INJECTION, SOLUTION SUBCUTANEOUS NIGHTLY
Status: DISCONTINUED | OUTPATIENT
Start: 2024-04-20 | End: 2024-04-20 | Stop reason: HOSPADM

## 2024-04-20 RX ADMIN — SODIUM CHLORIDE, PRESERVATIVE FREE 10 ML: 5 INJECTION INTRAVENOUS at 08:09

## 2024-04-20 RX ADMIN — CLOPIDOGREL BISULFATE 75 MG: 75 TABLET ORAL at 08:09

## 2024-04-20 RX ADMIN — FLUOXETINE 10 MG: 10 CAPSULE ORAL at 08:09

## 2024-04-20 RX ADMIN — ASPIRIN 81 MG 81 MG: 81 TABLET ORAL at 08:09

## 2024-04-20 RX ADMIN — SULFAMETHOXAZOLE AND TRIMETHOPRIM 1 TABLET: 800; 160 TABLET ORAL at 08:10

## 2024-04-20 RX ADMIN — INSULIN LISPRO 12 UNITS: 100 INJECTION, SOLUTION INTRAVENOUS; SUBCUTANEOUS at 12:13

## 2024-04-20 RX ADMIN — HEPARIN SODIUM 5000 UNITS: 5000 INJECTION INTRAVENOUS; SUBCUTANEOUS at 06:00

## 2024-04-20 RX ADMIN — HYDRALAZINE HYDROCHLORIDE 5 MG: 20 INJECTION, SOLUTION INTRAMUSCULAR; INTRAVENOUS at 09:26

## 2024-04-20 RX ADMIN — ISOSORBIDE MONONITRATE 30 MG: 30 TABLET, EXTENDED RELEASE ORAL at 08:09

## 2024-04-20 RX ADMIN — CARVEDILOL 3.12 MG: 3.12 TABLET, FILM COATED ORAL at 08:10

## 2024-04-20 RX ADMIN — FERROUS SULFATE TAB 325 MG (65 MG ELEMENTAL FE) 325 MG: 325 (65 FE) TAB at 08:10

## 2024-04-20 RX ADMIN — Medication 1 TABLET: at 08:09

## 2024-04-20 RX ADMIN — SACUBITRIL AND VALSARTAN 1 TABLET: 24; 26 TABLET, FILM COATED ORAL at 08:09

## 2024-04-20 NOTE — DISCHARGE SUMMARY
mg 3 times weekly, neph following recommending Bumex as needed. Strict I&Os. Daily weights. Fluid/salt restriction.      Essential hypertension: Entresto continued, Imdur, carvedilol with hold parameters.     Hyperlipidemia: Statin, increased     CAD: Reports history of PCI/stenting.  Continue aspirin/Plavix/statin       Chief Complaint on presentation: Toe wound    Initial H&P / Hospital Course:   Per H&P 4/14 \"Shelli Herndon is a 88 y.o. female with a history of peripheral artery disease, non-insulin-dependent type 2 diabetes mellitus, HFrEF, CKD, hypertension, hyperlipidemia, coronary artery disease, and left second digit foot wound who presented to [facility] with chief complaint of left second digit foot wound. The patient has been following with podiatry outpatient. She recently had the left digit wound cultured and it was positive for MRSA and Enterobacter cloacae. She was supposed to see cardiology in about 10 days for consideration of intervention to the lower extremities given peripheral arterial disease which was noted on an ultrasound, but the wound became worse so the patient sought treatment in the emergency department. X-rays in the emergency department were notable for findings consistent with osteomyelitis. The patient is being admitted for further evaluation. The patient denies any discomfort to the lower extremity. 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 No fevers or chills. The patient does appear to have some underlying CKD, although she and her daughter were not aware of this. She does not follow with a nephrologist. The patient has no other complaints at this time. She does take aspirin. \"     Hospital course: Patient admitted on 4/14 with concerns for left second digit toe wound.  Found to have osteomyelitis of left second digit.  Podiatry consulted-recommended vascular consult and workup with plans for amputation of left second digit.  Nephrology consulted on admission due to PEBBLES on CKD with need  in 2 week(s)      Duc Dee MD  [address]  [place] [address]  [phone]    Schedule an appointment as soon as possible for a visit in 1 month(s)  Will need blood work for BMP prior to appointment.    Simon Loredo DPM  [address] [place] [address]  [phone]    Schedule an appointment as soon as possible for a visit in 1 week(s)           Disposition: home  Condition at Discharge: Stable    Time Spent: 45 minutes    Signed:    Thank you Gurmeet Reilly MD for the opportunity to be involved in this patient's care.    Electronically signed by Wen Medina PA-C on 4/20/2024 at 1:47 PM  Discharging Hospitalist

## 2024-04-20 NOTE — PLAN OF CARE
Problem: ABCDS Injury Assessment  Goal: Absence of physical injury  4/20/2024 1352 by Citlalli Watson RN  Outcome: Adequate for Discharge     Problem: Skin/Tissue Integrity  Goal: Absence of new skin breakdown  Description: 1.  Monitor for areas of redness and/or skin breakdown  2.  Assess vascular access sites hourly  3.  Every 4-6 hours minimum:  Change oxygen saturation probe site  4.  Every 4-6 hours:  If on nasal continuous positive airway pressure, respiratory therapy assess nares and determine need for appliance change or resting period.  4/20/2024 1352 by Citlalli Watson RN  Outcome: Adequate for Discharge     Problem: Pain  Goal: Verbalizes/displays adequate comfort level or baseline comfort level  4/20/2024 1352 by Citlalli Watson RN  Outcome: Adequate for Discharge     Problem: Safety - Adult  Goal: Free from fall injury  4/20/2024 1352 by Citlalli Watson RN  Outcome: Adequate for Discharge     Problem: Discharge Planning  Goal: Discharge to home or other facility with appropriate resources  4/20/2024 1352 by Citlalli Watson RN  Outcome: Adequate for Discharge     Problem: Skin/Tissue Integrity - Adult  Goal: Incisions, wounds, or drain sites healing without S/S of infection  4/20/2024 1352 by Citlalli Watson RN  Outcome: Adequate for Discharge     Problem: Infection - Adult  Goal: Absence of infection during hospitalization  4/20/2024 1352 by Citlalli Watson RN  Outcome: Adequate for Discharge     Problem: Chronic Conditions and Co-morbidities  Goal: Patient's chronic conditions and co-morbidity symptoms are monitored and maintained or improved  4/20/2024 1352 by Citlalli Watson RN  Outcome: Adequate for Discharge     Problem: Nutrition Deficit:  Goal: Optimize nutritional status  4/20/2024 1352 by Citlalli Watson RN  Outcome: Adequate for Discharge    Care plan reviewed with patient , patient and  verbalized understanding of plan of care and contributed to goal setting.

## 2024-04-20 NOTE — PROGRESS NOTES
Discharge education and instructions provided. Patient verbalized understanding at this time. No questions asked. IV access removed. All personal belongings, AVS  present with patient. Transport to main lobby via wheelchair.

## 2024-04-20 NOTE — PLAN OF CARE
Problem: ABCDS Injury Assessment  Goal: Absence of physical injury  4/19/2024 2326 by Ashlee Agustin RN  Outcome: Progressing  Patient using call light appropriately to call for assistance with ambulation to bathroom.  Personal items within reach. Patient is also compliant with use of non-skid slippers.     Problem: Skin/Tissue Integrity  Goal: Absence of new skin breakdown  Description: 1.  Monitor for areas of redness and/or skin breakdown  2.  Assess vascular access sites hourly  3.  Every 4-6 hours minimum:  Change oxygen saturation probe site  4.  Every 4-6 hours:  If on nasal continuous positive airway pressure, respiratory therapy assess nares and determine need for appliance change or resting period.  4/19/2024 2326 by Ashlee Agustin RN  Outcome: Progressing     Problem: Pain  Goal: Verbalizes/displays adequate comfort level or baseline comfort level  4/19/2024 2326 by Ashlee Agustin RN  Outcome: Progressing  Flowsheets (Taken 4/19/2024 2154)  Verbalizes/displays adequate comfort level or baseline comfort level:   Encourage patient to monitor pain and request assistance   Assess pain using appropriate pain scale   Implement non-pharmacological measures as appropriate and evaluate response   Administer analgesics based on type and severity of pain and evaluate response   Notify Licensed Independent Practitioner if interventions unsuccessful or patient reports new pain     Problem: Safety - Adult  Goal: Free from fall injury  4/19/2024 2326 by Ashlee Agustin RN  Outcome: Progressing    Fall assessment completed. Patient using call light appropriately to call for assistance with ambulation to bathroom.  Personal items within reach. Patient is also compliant with use of non-skid slippers.      Problem: Discharge Planning  Goal: Discharge to home or other facility with appropriate resources  4/19/2024 2326 by Ashlee Agustin RN  Outcome: Progressing  Flowsheets (Taken 4/19/2024

## 2024-04-22 ENCOUNTER — TELEPHONE (OUTPATIENT)
Dept: CARDIOLOGY CLINIC | Age: 89
End: 2024-04-22

## 2024-04-22 NOTE — TELEPHONE ENCOUNTER
Patient's daughter called to cancel new pt appt with joseluis that was on 04/23/2024. She said that the patient already had the procedure done by joseluis in the hospital and is scheduled for another procedure 05/06.  Please f/u with pt on when she should be scheduled for her next appt for f/u

## 2024-04-22 NOTE — TELEPHONE ENCOUNTER
Pt originally scheduled with Dr. Horan and then ended up in the hospital.   Anamika-daughter notified to follow-up with procedure as scheduled.

## 2024-05-03 ENCOUNTER — PREP FOR PROCEDURE (OUTPATIENT)
Dept: CARDIOLOGY | Age: 89
End: 2024-05-03

## 2024-05-03 RX ORDER — SODIUM CHLORIDE 0.9 % (FLUSH) 0.9 %
5-40 SYRINGE (ML) INJECTION EVERY 12 HOURS SCHEDULED
Status: CANCELLED | OUTPATIENT
Start: 2024-05-03

## 2024-05-03 RX ORDER — ASPIRIN 325 MG
325 TABLET ORAL ONCE
Status: CANCELLED | OUTPATIENT
Start: 2024-05-03 | End: 2024-05-03

## 2024-05-03 RX ORDER — SODIUM CHLORIDE 0.9 % (FLUSH) 0.9 %
5-40 SYRINGE (ML) INJECTION PRN
Status: CANCELLED | OUTPATIENT
Start: 2024-05-03

## 2024-05-03 RX ORDER — SODIUM CHLORIDE 9 MG/ML
INJECTION, SOLUTION INTRAVENOUS PRN
Status: CANCELLED | OUTPATIENT
Start: 2024-05-03

## 2024-05-03 RX ORDER — DIPHENHYDRAMINE HYDROCHLORIDE 50 MG/ML
50 INJECTION INTRAMUSCULAR; INTRAVENOUS ONCE
Status: CANCELLED | OUTPATIENT
Start: 2024-05-03 | End: 2024-05-03

## 2024-05-03 RX ORDER — NITROGLYCERIN 0.4 MG/1
0.4 TABLET SUBLINGUAL EVERY 5 MIN PRN
Status: CANCELLED | OUTPATIENT
Start: 2024-05-03

## 2024-05-05 ENCOUNTER — HOSPITAL ENCOUNTER (OUTPATIENT)
Age: 89
Discharge: HOME | End: 2024-05-06
Attending: INTERNAL MEDICINE | Admitting: INTERNAL MEDICINE
Payer: MEDICARE

## 2024-05-05 DIAGNOSIS — N28.9 RENAL INSUFFICIENCY: Primary | ICD-10-CM

## 2024-05-05 LAB
ABO: NORMAL
ANION GAP SERPL CALC-SCNC: 14 MEQ/L (ref 8–16)
ANTIBODY SCREEN: NORMAL
APTT PPP: 35 SECONDS (ref 22–38)
BUN SERPL-MCNC: 32 MG/DL (ref 7–22)
CALCIUM SERPL-MCNC: 8.8 MG/DL (ref 8.5–10.5)
CHLORIDE SERPL-SCNC: 98 MEQ/L (ref 98–111)
CO2 SERPL-SCNC: 20 MEQ/L (ref 23–33)
CREAT SERPL-MCNC: 1.6 MG/DL (ref 0.4–1.2)
DEPRECATED RDW RBC AUTO: 52.7 FL (ref 35–45)
ERYTHROCYTE [DISTWIDTH] IN BLOOD BY AUTOMATED COUNT: 14.3 % (ref 11.5–14.5)
GFR SERPL CREATININE-BSD FRML MDRD: 31 ML/MIN/1.73M2
GLUCOSE SERPL-MCNC: 395 MG/DL (ref 70–108)
HCT VFR BLD AUTO: 39.7 % (ref 37–47)
HGB BLD-MCNC: 12.6 GM/DL (ref 12–16)
INR PPP: 0.93 (ref 0.85–1.13)
MCH RBC QN AUTO: 32.1 PG (ref 26–33)
MCHC RBC AUTO-ENTMCNC: 31.7 GM/DL (ref 32.2–35.5)
MCV RBC AUTO: 101.3 FL (ref 81–99)
PLATELET # BLD AUTO: 250 THOU/MM3 (ref 130–400)
PMV BLD AUTO: 10.2 FL (ref 9.4–12.4)
POTASSIUM SERPL-SCNC: 5.5 MEQ/L (ref 3.5–5.2)
RBC # BLD AUTO: 3.92 MILL/MM3 (ref 4.2–5.4)
RH FACTOR: NORMAL
SODIUM SERPL-SCNC: 132 MEQ/L (ref 135–145)
WBC # BLD AUTO: 4.7 THOU/MM3 (ref 4.8–10.8)

## 2024-05-05 PROCEDURE — 86900 BLOOD TYPING SEROLOGIC ABO: CPT

## 2024-05-05 PROCEDURE — 36415 COLL VENOUS BLD VENIPUNCTURE: CPT

## 2024-05-05 PROCEDURE — 80048 BASIC METABOLIC PNL TOTAL CA: CPT

## 2024-05-05 PROCEDURE — 85610 PROTHROMBIN TIME: CPT

## 2024-05-05 PROCEDURE — 2580000003 HC RX 258: Performed by: STUDENT IN AN ORGANIZED HEALTH CARE EDUCATION/TRAINING PROGRAM

## 2024-05-05 PROCEDURE — 86901 BLOOD TYPING SEROLOGIC RH(D): CPT

## 2024-05-05 PROCEDURE — 85730 THROMBOPLASTIN TIME PARTIAL: CPT

## 2024-05-05 PROCEDURE — 86850 RBC ANTIBODY SCREEN: CPT

## 2024-05-05 PROCEDURE — 85027 COMPLETE CBC AUTOMATED: CPT

## 2024-05-05 RX ORDER — DIPHENHYDRAMINE HYDROCHLORIDE 50 MG/ML
50 INJECTION INTRAMUSCULAR; INTRAVENOUS ONCE
Status: DISCONTINUED | OUTPATIENT
Start: 2024-05-06 | End: 2024-05-06 | Stop reason: HOSPADM

## 2024-05-05 RX ORDER — ASPIRIN 325 MG
325 TABLET ORAL ONCE
Status: COMPLETED | OUTPATIENT
Start: 2024-05-06 | End: 2024-05-06

## 2024-05-05 RX ORDER — SODIUM CHLORIDE 0.9 % (FLUSH) 0.9 %
5-40 SYRINGE (ML) INJECTION EVERY 12 HOURS SCHEDULED
Status: DISCONTINUED | OUTPATIENT
Start: 2024-05-05 | End: 2024-05-06 | Stop reason: HOSPADM

## 2024-05-05 RX ORDER — ASPIRIN 325 MG
325 TABLET ORAL ONCE
Status: DISCONTINUED | OUTPATIENT
Start: 2024-05-05 | End: 2024-05-05

## 2024-05-05 RX ORDER — SODIUM CHLORIDE 0.9 % (FLUSH) 0.9 %
5-40 SYRINGE (ML) INJECTION PRN
Status: DISCONTINUED | OUTPATIENT
Start: 2024-05-05 | End: 2024-05-06 | Stop reason: HOSPADM

## 2024-05-05 RX ORDER — SODIUM CHLORIDE 9 MG/ML
INJECTION, SOLUTION INTRAVENOUS PRN
Status: DISCONTINUED | OUTPATIENT
Start: 2024-05-05 | End: 2024-05-06 | Stop reason: HOSPADM

## 2024-05-05 RX ORDER — NITROGLYCERIN 0.4 MG/1
0.4 TABLET SUBLINGUAL EVERY 5 MIN PRN
Status: DISCONTINUED | OUTPATIENT
Start: 2024-05-05 | End: 2024-05-06 | Stop reason: HOSPADM

## 2024-05-05 RX ORDER — DIPHENHYDRAMINE HYDROCHLORIDE 50 MG/ML
50 INJECTION INTRAMUSCULAR; INTRAVENOUS ONCE
Status: DISCONTINUED | OUTPATIENT
Start: 2024-05-05 | End: 2024-05-05

## 2024-05-05 RX ADMIN — SODIUM CHLORIDE, PRESERVATIVE FREE 10 ML: 5 INJECTION INTRAVENOUS at 22:00

## 2024-05-06 VITALS
SYSTOLIC BLOOD PRESSURE: 144 MMHG | HEART RATE: 63 BPM | BODY MASS INDEX: 26.77 KG/M2 | TEMPERATURE: 98 F | RESPIRATION RATE: 16 BRPM | DIASTOLIC BLOOD PRESSURE: 59 MMHG | OXYGEN SATURATION: 100 % | WEIGHT: 128.1 LBS

## 2024-05-06 LAB
ANION GAP SERPL CALC-SCNC: 12 MEQ/L (ref 8–16)
BUN SERPL-MCNC: 29 MG/DL (ref 7–22)
CALCIUM SERPL-MCNC: 8.9 MG/DL (ref 8.5–10.5)
CHLORIDE SERPL-SCNC: 105 MEQ/L (ref 98–111)
CO2 SERPL-SCNC: 20 MEQ/L (ref 23–33)
CREAT SERPL-MCNC: 1.3 MG/DL (ref 0.4–1.2)
EKG ATRIAL RATE: 65 BPM
EKG P AXIS: 32 DEGREES
EKG P-R INTERVAL: 298 MS
EKG Q-T INTERVAL: 444 MS
EKG QRS DURATION: 130 MS
EKG QTC CALCULATION (BAZETT): 461 MS
EKG R AXIS: -26 DEGREES
EKG T AXIS: 170 DEGREES
EKG VENTRICULAR RATE: 65 BPM
GFR SERPL CREATININE-BSD FRML MDRD: 40 ML/MIN/1.73M2
GLUCOSE BLD STRIP.AUTO-MCNC: 109 MG/DL (ref 70–108)
GLUCOSE SERPL-MCNC: 117 MG/DL (ref 70–108)
POTASSIUM SERPL-SCNC: 5.4 MEQ/L (ref 3.5–5.2)
SODIUM SERPL-SCNC: 137 MEQ/L (ref 135–145)

## 2024-05-06 PROCEDURE — 82948 REAGENT STRIP/BLOOD GLUCOSE: CPT

## 2024-05-06 PROCEDURE — 80048 BASIC METABOLIC PNL TOTAL CA: CPT

## 2024-05-06 PROCEDURE — 6370000000 HC RX 637 (ALT 250 FOR IP): Performed by: STUDENT IN AN ORGANIZED HEALTH CARE EDUCATION/TRAINING PROGRAM

## 2024-05-06 PROCEDURE — 2580000003 HC RX 258: Performed by: STUDENT IN AN ORGANIZED HEALTH CARE EDUCATION/TRAINING PROGRAM

## 2024-05-06 PROCEDURE — 36415 COLL VENOUS BLD VENIPUNCTURE: CPT

## 2024-05-06 PROCEDURE — 93010 ELECTROCARDIOGRAM REPORT: CPT | Performed by: INTERNAL MEDICINE

## 2024-05-06 PROCEDURE — 93005 ELECTROCARDIOGRAM TRACING: CPT | Performed by: STUDENT IN AN ORGANIZED HEALTH CARE EDUCATION/TRAINING PROGRAM

## 2024-05-06 RX ADMIN — SODIUM CHLORIDE: 9 INJECTION, SOLUTION INTRAVENOUS at 00:03

## 2024-05-06 RX ADMIN — ASPIRIN 325 MG: 325 TABLET ORAL at 08:02

## 2024-05-06 NOTE — PROGRESS NOTES
Discharge teaching and instructions for diagnosis/procedure of renal insufficiency/ angiogram completed with patient using teachback method. AVS reviewed. Printed prescriptions given to patient. Patient voiced understanding regarding prescriptions, follow up appointments, and care of self at home. Discharged ambulatory and in a wheelchair to  home with support per family.

## 2024-05-14 PROBLEM — Z01.818 PREOPERATIVE CLEARANCE: Status: RESOLVED | Noted: 2024-04-14 | Resolved: 2024-05-14

## 2024-05-15 ENCOUNTER — OFFICE VISIT (OUTPATIENT)
Dept: NEPHROLOGY | Age: 89
End: 2024-05-15
Payer: MEDICARE

## 2024-05-15 ENCOUNTER — TELEPHONE (OUTPATIENT)
Dept: NEPHROLOGY | Age: 89
End: 2024-05-15

## 2024-05-15 ENCOUNTER — TELEPHONE (OUTPATIENT)
Dept: CARDIOLOGY CLINIC | Age: 89
End: 2024-05-15

## 2024-05-15 VITALS
HEART RATE: 75 BPM | WEIGHT: 126 LBS | BODY MASS INDEX: 26.33 KG/M2 | DIASTOLIC BLOOD PRESSURE: 66 MMHG | SYSTOLIC BLOOD PRESSURE: 146 MMHG | OXYGEN SATURATION: 98 %

## 2024-05-15 DIAGNOSIS — N18.32 STAGE 3B CHRONIC KIDNEY DISEASE (HCC): Primary | ICD-10-CM

## 2024-05-15 DIAGNOSIS — E87.5 HYPERKALEMIA: ICD-10-CM

## 2024-05-15 DIAGNOSIS — I10 ESSENTIAL HYPERTENSION: ICD-10-CM

## 2024-05-15 LAB
ANION GAP SERPL CALCULATED.3IONS-SCNC: 6 MMOL/L (ref 4–12)
BUN BLDV-MCNC: 24 MG/DL (ref 7–20)
CALCIUM SERPL-MCNC: 8.9 MG/DL (ref 8.8–10.5)
CHLORIDE BLD-SCNC: 106 MEQ/L (ref 101–111)
CO2: 24 MEQ/L (ref 21–32)
CREAT SERPL-MCNC: 1.4 MG/DL (ref 0.6–1.3)
CREATININE CLEARANCE: 36
GLUCOSE: 383 MG/DL (ref 70–110)
POTASSIUM SERPL-SCNC: 4.8 MEQ/L (ref 3.6–5)
SODIUM BLD-SCNC: 136 MEQ/L (ref 135–145)

## 2024-05-15 PROCEDURE — G8419 CALC BMI OUT NRM PARAM NOF/U: HCPCS | Performed by: INTERNAL MEDICINE

## 2024-05-15 PROCEDURE — G8427 DOCREV CUR MEDS BY ELIG CLIN: HCPCS | Performed by: INTERNAL MEDICINE

## 2024-05-15 PROCEDURE — 1090F PRES/ABSN URINE INCON ASSESS: CPT | Performed by: INTERNAL MEDICINE

## 2024-05-15 PROCEDURE — 99214 OFFICE O/P EST MOD 30 MIN: CPT | Performed by: INTERNAL MEDICINE

## 2024-05-15 PROCEDURE — 1111F DSCHRG MED/CURRENT MED MERGE: CPT | Performed by: INTERNAL MEDICINE

## 2024-05-15 PROCEDURE — 1123F ACP DISCUSS/DSCN MKR DOCD: CPT | Performed by: INTERNAL MEDICINE

## 2024-05-15 PROCEDURE — 1036F TOBACCO NON-USER: CPT | Performed by: INTERNAL MEDICINE

## 2024-05-15 RX ORDER — INSULIN GLARGINE 100 [IU]/ML
30 INJECTION, SOLUTION SUBCUTANEOUS DAILY
COMMUNITY

## 2024-05-15 NOTE — TELEPHONE ENCOUNTER
Duc Dee MD  [facility] Clinical Staff  Let the patient know that the patient's potassium is doing well and the kidney numbers are doing well as well    Patients daughter has been notified

## 2024-05-15 NOTE — PROGRESS NOTES
[facility]        Outpatient Follow-Up note         5/15/2024 9:17 AM    Patient Name:   Shelli Herndon  YOB: 1935  Primary Care Physician:  Gurmeet Reilly MD   [facility]  [address]  [address]  [facility] [address]  Dept: [phone]  Loc: [phone]     Chief Complaint / Reason for follow-up : Follow Up of CKD     Interval History :  Patient seen and examined by me.   Feels okay denies any complaints  Patient was recently in the hospital and had mild PEBBLES and had a arteriogram done with a stent placement  Patient due to have 1 more done however due to elevated creatinine it was canceled earlier this month     Past History :  Past Medical History:   Diagnosis Date    CAD (coronary artery disease)     heart attack    Diabetes mellitus (HCC)     Heart attack (HCC)     Hyperlipidemia     Hypertension      Past Surgical History:   Procedure Laterality Date    CARDIAC CATHETERIZATION      with stenting    CHOLECYSTECTOMY      CORONARY ANGIOPLASTY WITH STENT PLACEMENT      HYSTERECTOMY (CERVIX STATUS UNKNOWN)      TOE AMPUTATION Left 4/18/2024    LEFT 2ND TOE AMPUTATION AND TENOTOMY OF GREAT TOE performed by Simon Loredo DPM at [facility] OR        Medications :     Outpatient Medications Marked as Taking for the 5/15/24 encounter (Office Visit) with Duc Dee MD   Medication Sig Dispense Refill    insulin glargine (LANTUS) 100 UNIT/ML injection vial Inject 30 Units into the skin daily      aspirin 81 MG chewable tablet Take 1 tablet by mouth daily 30 tablet 3    rosuvastatin (CRESTOR) 20 MG tablet Take 1 tablet by mouth nightly 30 tablet 3    clopidogrel (PLAVIX) 75 MG tablet Take 1 tablet by mouth daily 30 tablet 3    carvedilol (COREG) 3.125 MG tablet Take 1 tablet by mouth 2 times daily (with meals)      sacubitril-valsartan (ENTRESTO) 24-26 MG per tablet Take 1 tablet by mouth 2 times

## 2024-05-15 NOTE — TELEPHONE ENCOUNTER
Dr. Horan-please review chart.   Scheduling brought order to schedule IR procedure with you.   However, upon reviewing chart it looks like patient has only been evaluated by Dr. Marte.   Does this angiogram need to be with Dr. Horan or Dr. Marte?    Order in Dr. Horan's box.

## 2024-05-20 NOTE — TELEPHONE ENCOUNTER
Stages plasty Peripheral angiogram scheduled 05-31-24, prehydation arrive 05-30-24 at 8:00pm  Left detailed msg at admitting    Tried calling pt, no answer, no voicemail  Left msg for daughter nusrat to call office for date, time and instructions

## 2024-06-17 ENCOUNTER — HOSPITAL ENCOUNTER (OUTPATIENT)
Age: 89
Discharge: HOME | End: 2024-06-18
Attending: INTERNAL MEDICINE | Admitting: INTERNAL MEDICINE
Payer: MEDICARE

## 2024-06-17 DIAGNOSIS — Z98.890 S/P ANGIOGRAM OF EXTREMITY: ICD-10-CM

## 2024-06-17 LAB
ABO: NORMAL
ANION GAP SERPL CALC-SCNC: 13 MEQ/L (ref 8–16)
ANTIBODY SCREEN: NORMAL
APTT PPP: 32.1 SECONDS (ref 22–38)
BUN SERPL-MCNC: 19 MG/DL (ref 7–22)
CALCIUM SERPL-MCNC: 9 MG/DL (ref 8.5–10.5)
CHLORIDE SERPL-SCNC: 102 MEQ/L (ref 98–111)
CHOLEST SERPL-MCNC: 198 MG/DL (ref 100–199)
CO2 SERPL-SCNC: 23 MEQ/L (ref 23–33)
CREAT SERPL-MCNC: 1 MG/DL (ref 0.4–1.2)
DEPRECATED RDW RBC AUTO: 46.5 FL (ref 35–45)
ERYTHROCYTE [DISTWIDTH] IN BLOOD BY AUTOMATED COUNT: 12.9 % (ref 11.5–14.5)
GFR SERPL CREATININE-BSD FRML MDRD: 55 ML/MIN/1.73M2
GLUCOSE BLD STRIP.AUTO-MCNC: 198 MG/DL (ref 70–108)
GLUCOSE SERPL-MCNC: 233 MG/DL (ref 70–108)
HCT VFR BLD AUTO: 42.2 % (ref 37–47)
HDLC SERPL-MCNC: 48 MG/DL
HGB BLD-MCNC: 13.7 GM/DL (ref 12–16)
INR PPP: 0.87 (ref 0.85–1.13)
LDLC SERPL CALC-MCNC: ABNORMAL MG/DL
MCH RBC QN AUTO: 31.7 PG (ref 26–33)
MCHC RBC AUTO-ENTMCNC: 32.5 GM/DL (ref 32.2–35.5)
MCV RBC AUTO: 97.7 FL (ref 81–99)
PLATELET # BLD AUTO: 178 THOU/MM3 (ref 130–400)
PMV BLD AUTO: 9.8 FL (ref 9.4–12.4)
POTASSIUM SERPL-SCNC: 4.3 MEQ/L (ref 3.5–5.2)
RBC # BLD AUTO: 4.32 MILL/MM3 (ref 4.2–5.4)
RH FACTOR: NORMAL
SODIUM SERPL-SCNC: 138 MEQ/L (ref 135–145)
TRIGL SERPL-MCNC: 463 MG/DL (ref 0–199)
WBC # BLD AUTO: 5.3 THOU/MM3 (ref 4.8–10.8)

## 2024-06-17 PROCEDURE — 82948 REAGENT STRIP/BLOOD GLUCOSE: CPT

## 2024-06-17 PROCEDURE — 86900 BLOOD TYPING SEROLOGIC ABO: CPT

## 2024-06-17 PROCEDURE — 2580000003 HC RX 258: Performed by: PHYSICIAN ASSISTANT

## 2024-06-17 PROCEDURE — 85610 PROTHROMBIN TIME: CPT

## 2024-06-17 PROCEDURE — 6370000000 HC RX 637 (ALT 250 FOR IP): Performed by: PHYSICIAN ASSISTANT

## 2024-06-17 PROCEDURE — 86901 BLOOD TYPING SEROLOGIC RH(D): CPT

## 2024-06-17 PROCEDURE — 36415 COLL VENOUS BLD VENIPUNCTURE: CPT

## 2024-06-17 PROCEDURE — 80061 LIPID PANEL: CPT

## 2024-06-17 PROCEDURE — 86850 RBC ANTIBODY SCREEN: CPT

## 2024-06-17 PROCEDURE — 80048 BASIC METABOLIC PNL TOTAL CA: CPT

## 2024-06-17 PROCEDURE — 93005 ELECTROCARDIOGRAM TRACING: CPT | Performed by: PHYSICIAN ASSISTANT

## 2024-06-17 PROCEDURE — 85730 THROMBOPLASTIN TIME PARTIAL: CPT

## 2024-06-17 PROCEDURE — 85027 COMPLETE CBC AUTOMATED: CPT

## 2024-06-17 RX ORDER — SODIUM CHLORIDE 0.9 % (FLUSH) 0.9 %
5-40 SYRINGE (ML) INJECTION EVERY 12 HOURS SCHEDULED
Status: DISCONTINUED | OUTPATIENT
Start: 2024-06-17 | End: 2024-06-18 | Stop reason: HOSPADM

## 2024-06-17 RX ORDER — SODIUM CHLORIDE 0.9 % (FLUSH) 0.9 %
5-40 SYRINGE (ML) INJECTION EVERY 12 HOURS SCHEDULED
Status: CANCELLED | OUTPATIENT
Start: 2024-06-17

## 2024-06-17 RX ORDER — SODIUM CHLORIDE 9 MG/ML
INJECTION, SOLUTION INTRAVENOUS PRN
Status: DISCONTINUED | OUTPATIENT
Start: 2024-06-17 | End: 2024-06-18 | Stop reason: HOSPADM

## 2024-06-17 RX ORDER — SODIUM CHLORIDE 0.9 % (FLUSH) 0.9 %
5-40 SYRINGE (ML) INJECTION PRN
Status: CANCELLED | OUTPATIENT
Start: 2024-06-17

## 2024-06-17 RX ORDER — SODIUM CHLORIDE 9 MG/ML
INJECTION, SOLUTION INTRAVENOUS CONTINUOUS
Status: DISCONTINUED | OUTPATIENT
Start: 2024-06-17 | End: 2024-06-18 | Stop reason: HOSPADM

## 2024-06-17 RX ORDER — SODIUM CHLORIDE 0.9 % (FLUSH) 0.9 %
5-40 SYRINGE (ML) INJECTION PRN
Status: DISCONTINUED | OUTPATIENT
Start: 2024-06-17 | End: 2024-06-18 | Stop reason: HOSPADM

## 2024-06-17 RX ORDER — ASPIRIN 325 MG
325 TABLET ORAL ONCE
Status: CANCELLED | OUTPATIENT
Start: 2024-06-17 | End: 2024-06-17

## 2024-06-17 RX ORDER — SODIUM CHLORIDE 9 MG/ML
INJECTION, SOLUTION INTRAVENOUS PRN
Status: CANCELLED | OUTPATIENT
Start: 2024-06-17

## 2024-06-17 RX ORDER — SODIUM CHLORIDE 9 MG/ML
INJECTION, SOLUTION INTRAVENOUS CONTINUOUS
Status: CANCELLED | OUTPATIENT
Start: 2024-06-17

## 2024-06-17 RX ORDER — ASPIRIN 325 MG
325 TABLET ORAL ONCE
Status: COMPLETED | OUTPATIENT
Start: 2024-06-17 | End: 2024-06-17

## 2024-06-17 RX ADMIN — ASPIRIN 325 MG: 325 TABLET ORAL at 22:11

## 2024-06-17 RX ADMIN — SODIUM CHLORIDE: 9 INJECTION, SOLUTION INTRAVENOUS at 22:09

## 2024-06-18 ENCOUNTER — APPOINTMENT (OUTPATIENT)
Dept: INTERVENTIONAL RADIOLOGY/VASCULAR | Age: 89
End: 2024-06-18
Payer: MEDICARE

## 2024-06-18 VITALS
DIASTOLIC BLOOD PRESSURE: 70 MMHG | BODY MASS INDEX: 27.3 KG/M2 | WEIGHT: 130.07 LBS | SYSTOLIC BLOOD PRESSURE: 133 MMHG | TEMPERATURE: 98.4 F | RESPIRATION RATE: 16 BRPM | OXYGEN SATURATION: 100 % | HEIGHT: 58 IN | HEART RATE: 79 BPM

## 2024-06-18 LAB
EKG ATRIAL RATE: 67 BPM
EKG P AXIS: 31 DEGREES
EKG P-R INTERVAL: 282 MS
EKG Q-T INTERVAL: 430 MS
EKG QRS DURATION: 126 MS
EKG QTC CALCULATION (BAZETT): 454 MS
EKG R AXIS: -29 DEGREES
EKG T AXIS: 149 DEGREES
EKG VENTRICULAR RATE: 67 BPM
GLUCOSE BLD STRIP.AUTO-MCNC: 220 MG/DL (ref 70–108)
GLUCOSE BLD STRIP.AUTO-MCNC: 361 MG/DL (ref 70–108)

## 2024-06-18 PROCEDURE — 6360000002 HC RX W HCPCS: Performed by: INTERNAL MEDICINE

## 2024-06-18 PROCEDURE — 82948 REAGENT STRIP/BLOOD GLUCOSE: CPT

## 2024-06-18 PROCEDURE — 6360000004 HC RX CONTRAST MEDICATION: Performed by: INTERNAL MEDICINE

## 2024-06-18 PROCEDURE — 37224 HC FEM POP TERRITORY PLASTY: CPT

## 2024-06-18 PROCEDURE — 2580000003 HC RX 258: Performed by: INTERNAL MEDICINE

## 2024-06-18 PROCEDURE — 6370000000 HC RX 637 (ALT 250 FOR IP): Performed by: NURSE PRACTITIONER

## 2024-06-18 PROCEDURE — 2500000003 HC RX 250 WO HCPCS: Performed by: INTERNAL MEDICINE

## 2024-06-18 PROCEDURE — 6370000000 HC RX 637 (ALT 250 FOR IP): Performed by: INTERNAL MEDICINE

## 2024-06-18 PROCEDURE — C1760 CLOSURE DEV, VASC: HCPCS

## 2024-06-18 RX ORDER — HYDRALAZINE HYDROCHLORIDE 20 MG/ML
10 INJECTION INTRAMUSCULAR; INTRAVENOUS EVERY 4 HOURS PRN
Status: DISCONTINUED | OUTPATIENT
Start: 2024-06-18 | End: 2024-06-18 | Stop reason: HOSPADM

## 2024-06-18 RX ORDER — HEPARIN SODIUM 1000 [USP'U]/ML
INJECTION, SOLUTION INTRAVENOUS; SUBCUTANEOUS PRN
Status: COMPLETED | OUTPATIENT
Start: 2024-06-18 | End: 2024-06-18

## 2024-06-18 RX ORDER — CARVEDILOL 3.12 MG/1
3.12 TABLET ORAL 2 TIMES DAILY
Status: DISCONTINUED | OUTPATIENT
Start: 2024-06-18 | End: 2024-06-18 | Stop reason: HOSPADM

## 2024-06-18 RX ORDER — SODIUM CHLORIDE 0.9 % (FLUSH) 0.9 %
5-40 SYRINGE (ML) INJECTION EVERY 12 HOURS SCHEDULED
Status: DISCONTINUED | OUTPATIENT
Start: 2024-06-18 | End: 2024-06-18 | Stop reason: HOSPADM

## 2024-06-18 RX ORDER — HYDRALAZINE HYDROCHLORIDE 20 MG/ML
INJECTION INTRAMUSCULAR; INTRAVENOUS PRN
Status: COMPLETED | OUTPATIENT
Start: 2024-06-18 | End: 2024-06-18

## 2024-06-18 RX ORDER — SODIUM CHLORIDE 9 MG/ML
INJECTION, SOLUTION INTRAVENOUS PRN
Status: DISCONTINUED | OUTPATIENT
Start: 2024-06-18 | End: 2024-06-18 | Stop reason: HOSPADM

## 2024-06-18 RX ORDER — CLOPIDOGREL BISULFATE 75 MG/1
TABLET ORAL PRN
Status: COMPLETED | OUTPATIENT
Start: 2024-06-18 | End: 2024-06-18

## 2024-06-18 RX ORDER — ACETAMINOPHEN 325 MG/1
650 TABLET ORAL EVERY 4 HOURS PRN
Status: DISCONTINUED | OUTPATIENT
Start: 2024-06-18 | End: 2024-06-18 | Stop reason: HOSPADM

## 2024-06-18 RX ORDER — MIDAZOLAM HYDROCHLORIDE 5 MG/ML
INJECTION INTRAMUSCULAR; INTRAVENOUS PRN
Status: COMPLETED | OUTPATIENT
Start: 2024-06-18 | End: 2024-06-18

## 2024-06-18 RX ORDER — ISOSORBIDE MONONITRATE 30 MG/1
30 TABLET, EXTENDED RELEASE ORAL DAILY
Status: DISCONTINUED | OUTPATIENT
Start: 2024-06-18 | End: 2024-06-18 | Stop reason: HOSPADM

## 2024-06-18 RX ORDER — FENTANYL CITRATE 50 UG/ML
INJECTION, SOLUTION INTRAMUSCULAR; INTRAVENOUS PRN
Status: COMPLETED | OUTPATIENT
Start: 2024-06-18 | End: 2024-06-18

## 2024-06-18 RX ORDER — SODIUM CHLORIDE 0.9 % (FLUSH) 0.9 %
5-40 SYRINGE (ML) INJECTION PRN
Status: DISCONTINUED | OUTPATIENT
Start: 2024-06-18 | End: 2024-06-18 | Stop reason: HOSPADM

## 2024-06-18 RX ORDER — LIDOCAINE HYDROCHLORIDE 20 MG/ML
INJECTION, SOLUTION EPIDURAL; INFILTRATION; INTRACAUDAL; PERINEURAL PRN
Status: COMPLETED | OUTPATIENT
Start: 2024-06-18 | End: 2024-06-18

## 2024-06-18 RX ORDER — SODIUM CHLORIDE 9 MG/ML
INJECTION, SOLUTION INTRAVENOUS CONTINUOUS
Status: ACTIVE | OUTPATIENT
Start: 2024-06-18 | End: 2024-06-18

## 2024-06-18 RX ADMIN — CARVEDILOL 3.12 MG: 3.12 TABLET, FILM COATED ORAL at 12:22

## 2024-06-18 RX ADMIN — HYDRALAZINE HYDROCHLORIDE 10 MG: 20 INJECTION, SOLUTION INTRAMUSCULAR; INTRAVENOUS at 08:54

## 2024-06-18 RX ADMIN — IOPAMIDOL 30 ML: 612 INJECTION, SOLUTION INTRAVENOUS at 09:13

## 2024-06-18 RX ADMIN — ISOSORBIDE MONONITRATE 30 MG: 30 TABLET, EXTENDED RELEASE ORAL at 12:23

## 2024-06-18 RX ADMIN — LIDOCAINE HYDROCHLORIDE 5 ML: 20 INJECTION, SOLUTION EPIDURAL; INFILTRATION; INTRACAUDAL; PERINEURAL at 08:36

## 2024-06-18 RX ADMIN — SODIUM CHLORIDE: 9 INJECTION, SOLUTION INTRAVENOUS at 10:05

## 2024-06-18 RX ADMIN — FENTANYL CITRATE 25 MCG: 50 INJECTION, SOLUTION INTRAMUSCULAR; INTRAVENOUS at 08:56

## 2024-06-18 RX ADMIN — HEPARIN SODIUM 1500 UNITS: 1000 INJECTION INTRAVENOUS; SUBCUTANEOUS at 08:58

## 2024-06-18 RX ADMIN — CLOPIDOGREL BISULFATE 75 MG: 75 TABLET, FILM COATED ORAL at 09:26

## 2024-06-18 RX ADMIN — HEPARIN SODIUM 3000 UNITS: 1000 INJECTION INTRAVENOUS; SUBCUTANEOUS at 08:38

## 2024-06-18 RX ADMIN — Medication 1 MG: at 08:29

## 2024-06-18 RX ADMIN — Medication 200 MCG: at 09:06

## 2024-06-18 RX ADMIN — FENTANYL CITRATE 25 MCG: 50 INJECTION, SOLUTION INTRAMUSCULAR; INTRAVENOUS at 08:29

## 2024-06-18 RX ADMIN — SACUBITRIL AND VALSARTAN 1 TABLET: 24; 26 TABLET, FILM COATED ORAL at 12:22

## 2024-06-18 NOTE — BRIEF OP NOTE
[facility]  Sedation/Analgesia Post Sedation Record        Pt Name: Shelli Herndon  MRN: [id number]  YOB: 1935  Procedure Performed By: Paramjit Marte MD MD, CHERI, KATHYA, CHELSEY  Primary Care Physician: Gurmeet Reilly MD        POST-PROCEDURE    Physicians/Assistants: Paramjit Marte MD MD, CHERI, KATHYA, CHELSEY    Procedure Performed:  Lower ext angiogram + DCB to Rt SFA                                  Sedation/Anesthesia:  Local Anesthesia and IV Conscious Sedation with continuous O2 monitoring    Estimated Blood Loss: 10 cc     Specimens Removed:  [x]None []Other:      Disposition of Specimen:  []Pathology []Other        Complications:   [x]None Immediate []Other:       Post Procedure Diagnosis/Findings:  Peripheral Artery Disease          Recommendations:    DAPT  Monitor access site  Routine post cath care              Paramjit Marte MD MD, CHERI, KATHYA, CHELSEY  Electronically signed 6/18/2024 at 9:35 AM

## 2024-06-18 NOTE — H&P
[facility]  Sedation/Analgesia History & Physical    Pt Name: Shelli Herndon  Account number: [id number]  MRN: [id number]  YOB: 1935  Provider Performing Procedure: Paramjit Marte MD MD [facility]  Primary Care Physician: Gurmeet Reilly MD  Date: 6/18/2024    PRE-PROCEDURE    Code Status: FULL CODE  Brief History/Pre-Procedure Diagnosis: severe PAD with lifestyle limiting claudication  Staged PTCA of Rt. SFA    Consent: : I have discussed with the patient risks, benefits, and alternatives (and relevant risks, benefits, and side effects related to alternatives or not receiving care), and likelihood of the success.   The patient and/or representative appear to understand and agree to proceed.  The discussion encompasses risks, benefits, and side effects related to the alternatives and the risks related to not receiving the proposed care, treatment, and services.       PLANNED PROCEDURE   []Cath  []PCI                []Pacemaker/AICD  []CARIE             []Cardioversion [x]Peripheral angiography/PTA  []Other:      VITAL SIGNS   Vitals:    06/18/24 0800   BP: (!) 177/66   Pulse: 57   Resp:    Temp: 97.5 °F (36.4 °C)   SpO2: 100%       PHYSICAL:   General: No acute distress  HEENT:  Unremarkable for age  Neck: without increased JVD, carotid pulses 2+ bilaterally without bruits  Heart: RRR, S1 & S2 WNL   Lungs: Clear to auscultation    Abdomen: BS present, without HSM, masses, or tenderness    Extremities: without C,C,E.  Pulses poor bilaterally  Mental Status: Alert & Oriented    SEDATION  Planned agent:[x]Midazolam []Meperidine []Sublimaze []Morphine  []Diazepam  [x]Other: fentanyl      ASA Classification:  []1 []2 [x]3 []4 []5  Class 1: A normal healthy patient  Class 2: Pt with mild to moderate systemic disease  Class 3: Severe systemic disease or disturbance  Class 4: Severe systemic disorders that are already life threatening.  Class 5: Moribund pt with little chances of survival, for more

## 2024-06-18 NOTE — PROGRESS NOTES
Discharge teaching and instructions for diagnosis/procedure of angiogram completed with patient using teachback method. AVS reviewed.. Patient voiced understanding regarding prescriptions, follow up appointments, and care of self at home. Discharged in a wheelchair to  home with support per family

## 2024-06-18 NOTE — PROCEDURES
PROCEDURE NOTE  Date: 6/17/2024   Name: Shelli Herndon  YOB: 1935    Procedures  12 lead EKG completed. Results handed to Maia BLUE.

## 2024-06-18 NOTE — PROCEDURES
[address] [address]                         CARDIAC CATHETERIZATION      PATIENT NAME: MK BLISS                : 1935  MED REC NO: [id number]                       ROOM: [facility]  ACCOUNT NO: [id number]                       ADMIT DATE: 2024  PROVIDER: Paramjit Marte MD    PERIPHERAL ANGIOGRAM/ANGIOPLASTY REPORT    DATE OF PROCEDURE:  2024      PROCEDURE PERFORMED:  Peripheral angiogram, drug-coated balloon angioplasty of the proximal mid and distal right SFA.    INDICATIONS FOR STUDY:  Severe lifestyle-limiting claudication, abnormal noninvasive study, abnormal prior angiogram.    DESCRIPTION OF PROCEDURE:  After informed consent was obtained, the patient was brought to the special procedures laboratory in a fasting, nonsedated state.  Preprocedure time-out was performed.  2% lidocaine was used to anesthetize the subcutaneous tissue overlying the left femoral artery.  Using a modified Seldinger technique with the assistance of ultrasound and fluoroscopy guidance, we were able to place a 5-[place] sheath in the left common femoral artery.  Once the sheath was in place, we went up and over with the Omni Flush catheter along with Glide Advantage wire and then exchanged out to a 6 x 40 cm Avi sheath.  Once the sheath was in place, IV heparin was given to maintain ACT in the 200 range.    After this, diluted contrast was given, did take images of the right lower extremity.    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  Below the knee, there is a 2-vessel runoff with a patent anterior as well as the posterior but occluded peroneal arteries.    The patient does not have any critical limb ischemia findings, such as

## 2024-06-18 NOTE — PROGRESS NOTES
[facility]   PROGRESS NOTE      Patient: Shelli Herndon  Room #: 8A-10/010-A            YOB: 1935  Age: 88 y.o.  Gender: female            Admit Date & Time: 6/17/2024  7:44 PM    Situation:    This is a spiritual health encounter in response to request for patient support. Patient, Shelli, was laying in bed at the time of this visit.    Assessment:  Patient could not remember what brought her to the hospital, but shared that she is feeling good at this time. She shared stories from her life living in other [place] and shared about her love of knitting. She expressed gratitude for the support of her family and said that her Yazidism estrellita helps her to cope with the health issues that come her way-it helps her to \"remain calm\" when things are happening to her.     Intervention:   provided empathic listening and reflection.  facilitated storytelling and provided information regarding ongoing  support.     Plan:   team will remain available to support patient/family, PRN.         Electronically signed by Chaplain Camden Tucker M.Div, on 6/18/2024 at 10:34 AM.     Spiritual Care Department  [place] [id number]  [phone]

## 2024-06-18 NOTE — PROGRESS NOTES
0800 Patient received in IR for Lower Extremity angiogram  0805 This procedure has been fully reviewed with the patient and written informed consent has been obtained.  0829 Procedure started with    0836 Access obtained left femoral artery 1 stick 6Fr sheath advanced   0844 AO pressure 159/48  0846 Right lower extremity angiogram completed multiple images taken   0855 [place] 5mm x 200mm Balloon prepped and advanced to right SFA for angioplasty   0902 IN PACT DCB 5mm x 250mm Balloon prepped and advanced to Right SFA for angioplasty   0914 Procedure completed; patient tolerated well. Angioseal used for hemostasis; no bleeding noted.  0925 Patient on bed; comfort ensured.  0930 Patient taken to 8A via IR staff report to be given in room.

## 2024-06-18 NOTE — PLAN OF CARE
Problem: Chronic Conditions and Co-morbidities  Goal: Patient's chronic conditions and co-morbidity symptoms are monitored and maintained or improved  Outcome: Progressing  Flowsheets (Taken 6/17/2024 2015)  Care Plan - Patient's Chronic Conditions and Co-Morbidity Symptoms are Monitored and Maintained or Improved:   Monitor and assess patient's chronic conditions and comorbid symptoms for stability, deterioration, or improvement   Collaborate with multidisciplinary team to address chronic and comorbid conditions and prevent exacerbation or deterioration   Update acute care plan with appropriate goals if chronic or comorbid symptoms are exacerbated and prevent overall improvement and discharge     Problem: Discharge Planning  Goal: Discharge to home or other facility with appropriate resources  Outcome: Progressing  Flowsheets  Taken 6/18/2024 0542  Discharge to home or other facility with appropriate resources:   Identify barriers to discharge with patient and caregiver   Arrange for needed discharge resources and transportation as appropriate   Identify discharge learning needs (meds, wound care, etc)  Taken 6/17/2024 2015  Discharge to home or other facility with appropriate resources:   Identify barriers to discharge with patient and caregiver   Arrange for needed discharge resources and transportation as appropriate   Identify discharge learning needs (meds, wound care, etc)     Problem: ABCDS Injury Assessment  Goal: Absence of physical injury  Outcome: Progressing  Flowsheets (Taken 6/18/2024 0531)  Absence of Physical Injury: Implement safety measures based on patient assessment     Problem: Safety - Adult  Goal: Free from fall injury  Outcome: Progressing

## 2024-07-02 RX ORDER — ASPIRIN 81 MG/1
TABLET, CHEWABLE ORAL
Qty: 30 TABLET | Refills: 0 | Status: SHIPPED | OUTPATIENT
Start: 2024-07-02

## 2024-07-03 PROBLEM — I73.9 PAD (PERIPHERAL ARTERY DISEASE) (HCC): Status: ACTIVE | Noted: 2024-07-03

## 2024-09-09 RX ORDER — CLOPIDOGREL BISULFATE 75 MG/1
75 TABLET ORAL DAILY
Qty: 30 TABLET | Refills: 3 | OUTPATIENT
Start: 2024-09-09

## 2024-09-09 RX ORDER — ROSUVASTATIN CALCIUM 20 MG/1
20 TABLET, COATED ORAL NIGHTLY
Qty: 30 TABLET | Refills: 3 | OUTPATIENT
Start: 2024-09-09

## 2024-10-01 ENCOUNTER — HOSPITAL ENCOUNTER (OUTPATIENT)
Dept: CT IMAGING | Age: 89
Discharge: HOME | End: 2024-10-01
Attending: RADIOLOGY

## 2024-10-01 DIAGNOSIS — Z00.6 ENCOUNTER FOR EXAMINATION FOR NORMAL COMPARISON OR CONTROL IN CLINICAL RESEARCH PROGRAM: ICD-10-CM

## (undated) DEVICE — HYPODERMIC SAFETY NEEDLE: Brand: MAGELLAN

## (undated) DEVICE — BANDAGE COMPR W4INXL12FT E DISP ESMARCH EVEN

## (undated) DEVICE — SET UP: Brand: MEDLINE INDUSTRIES, INC.

## (undated) DEVICE — BANDAGE,GAUZE,4.5"X4.1YD,STERILE,LF: Brand: MEDLINE

## (undated) DEVICE — GLOVE ORANGE PI 8   MSG9080

## (undated) DEVICE — PENCIL SMK EVAC ALL IN 1 DSGN ENH VISIBILITY IMPROVED AIR

## (undated) DEVICE — DRAPE,EXTREMITY,89X128,STERILE: Brand: MEDLINE

## (undated) DEVICE — SOLUTION PREP PAINT POV IOD FOR SKIN MUCOUS MEM

## (undated) DEVICE — SHEET, ORTHO, SPLIT, STERILE: Brand: MEDLINE

## (undated) DEVICE — BASIC SINGLE BASIN BTC-LF: Brand: MEDLINE INDUSTRIES, INC.

## (undated) DEVICE — IMPREGNATED GAUZE DRESSING: Brand: CUTICERIN 7.5X7.5CM CTN 50

## (undated) DEVICE — CLEANSER WND CLN DEB SYS IRRISEPT

## (undated) DEVICE — GLOVE SURG SZ 8 L11.77IN FNGR THK9.8MIL STRW LTX POLYMER

## (undated) DEVICE — BANDAGE COMPR E SGL LAYERED CLSR BGE W/ CLP W4INXL15FT

## (undated) DEVICE — GAUZE,SPONGE,8"X4",12PLY,XRAY,STRL,LF: Brand: MEDLINE

## (undated) DEVICE — SUTURE PROL SZ 3-0 L18IN NONABSORBABLE BLU L30MM FS-1 3/8 8663G

## (undated) DEVICE — SYRINGE MED 10ML LUERLOCK TIP W/O SFTY DISP

## (undated) DEVICE — DRESSING ALG W3XL4IN TRNSPAR ANTIMIC WND CNTCT LAYR W/

## (undated) DEVICE — SUTURE VICRYL + SZ 3-0 L27IN ABSRB UD L26MM SH 1/2 CIR VCP416H

## (undated) DEVICE — SUTURE MONOCRYL SZ 4-0 L27IN ABSRB UD L19MM PS-2 1/2 CIR PRIM Y426H

## (undated) DEVICE — DRAPE,U/SHT,SPLIT,FILM,60X84,STERILE: Brand: MEDLINE

## (undated) DEVICE — GOWN,SIRUS,NONRNF,SETINSLV,XL,20/CS: Brand: MEDLINE

## (undated) DEVICE — PREMIUM DRY TRAY LF: Brand: MEDLINE INDUSTRIES, INC.

## (undated) DEVICE — SOLUTION SCRB 4OZ 7.5% POVIDONE IOD ANTIMIC BTL

## (undated) DEVICE — SYRINGE IRRIG 60ML SFT PLIABLE BLB EZ TO GRP 1 HND USE W/